# Patient Record
Sex: FEMALE | Race: WHITE | Employment: FULL TIME | URBAN - METROPOLITAN AREA
[De-identification: names, ages, dates, MRNs, and addresses within clinical notes are randomized per-mention and may not be internally consistent; named-entity substitution may affect disease eponyms.]

---

## 2017-03-20 ENCOUNTER — OFFICE VISIT (OUTPATIENT)
Dept: FAMILY MEDICINE CLINIC | Facility: CLINIC | Age: 32
End: 2017-03-20

## 2017-03-20 VITALS
WEIGHT: 178.38 LBS | RESPIRATION RATE: 12 BRPM | SYSTOLIC BLOOD PRESSURE: 110 MMHG | DIASTOLIC BLOOD PRESSURE: 60 MMHG | HEIGHT: 63 IN | BODY MASS INDEX: 31.61 KG/M2 | TEMPERATURE: 98 F | HEART RATE: 74 BPM

## 2017-03-20 DIAGNOSIS — J06.9 VIRAL URI: ICD-10-CM

## 2017-03-20 DIAGNOSIS — J02.9 SORE THROAT: Primary | ICD-10-CM

## 2017-03-20 LAB
CONTROL LINE PRESENT WITH A CLEAR BACKGROUND (YES/NO): YES YES/NO
STREP GRP A CUL-SCR: POSITIVE

## 2017-03-20 PROCEDURE — 96372 THER/PROPH/DIAG INJ SC/IM: CPT | Performed by: FAMILY MEDICINE

## 2017-03-20 PROCEDURE — 87880 STREP A ASSAY W/OPTIC: CPT | Performed by: FAMILY MEDICINE

## 2017-03-20 PROCEDURE — 99213 OFFICE O/P EST LOW 20 MIN: CPT | Performed by: FAMILY MEDICINE

## 2017-03-20 NOTE — PROGRESS NOTES
HPI:   Regis Ortega is a 27year old female who presents for upper respiratory symptoms for 3 days. Symptoms include sore throat, achiness, cough, vomited a few times sat, decreased appetite, head pressure, congested.  Thinks she's getting fevers, waking discharge  HEENT: congested, sore throat  LUNGS: denies shortness of breath with exertion; + cough  CARDIOVASCULAR: denies chest pain on exertion  GI: no nausea or abdominal pain  NEURO: denies headaches    EXAM:   /60 mmHg  Pulse 74  Temp(Src) 97.9

## 2017-03-31 ENCOUNTER — TELEPHONE (OUTPATIENT)
Dept: FAMILY MEDICINE CLINIC | Facility: CLINIC | Age: 32
End: 2017-03-31

## 2017-03-31 RX ORDER — ESCITALOPRAM OXALATE 10 MG/1
TABLET ORAL
Qty: 90 TABLET | Refills: 0 | OUTPATIENT
Start: 2017-03-31

## 2017-05-19 ENCOUNTER — TELEPHONE (OUTPATIENT)
Dept: FAMILY MEDICINE CLINIC | Facility: CLINIC | Age: 32
End: 2017-05-19

## 2017-06-16 ENCOUNTER — OFFICE VISIT (OUTPATIENT)
Dept: FAMILY MEDICINE CLINIC | Facility: CLINIC | Age: 32
End: 2017-06-16

## 2017-06-16 VITALS
TEMPERATURE: 98 F | HEART RATE: 88 BPM | SYSTOLIC BLOOD PRESSURE: 128 MMHG | DIASTOLIC BLOOD PRESSURE: 78 MMHG | WEIGHT: 179 LBS | BODY MASS INDEX: 32 KG/M2

## 2017-06-16 DIAGNOSIS — Z13.1 DIABETES MELLITUS SCREENING: ICD-10-CM

## 2017-06-16 DIAGNOSIS — Z13.29 THYROID DISORDER SCREEN: ICD-10-CM

## 2017-06-16 DIAGNOSIS — Z12.39 SCREENING BREAST EXAMINATION: ICD-10-CM

## 2017-06-16 DIAGNOSIS — N92.6 IRREGULAR PERIODS: ICD-10-CM

## 2017-06-16 DIAGNOSIS — Z13.220 LIPID SCREENING: ICD-10-CM

## 2017-06-16 DIAGNOSIS — E55.9 VITAMIN D DEFICIENCY: ICD-10-CM

## 2017-06-16 DIAGNOSIS — G44.209 TENSION HEADACHE: ICD-10-CM

## 2017-06-16 DIAGNOSIS — Z13.0 SCREENING, ANEMIA, DEFICIENCY, IRON: ICD-10-CM

## 2017-06-16 DIAGNOSIS — F33.41 RECURRENT MAJOR DEPRESSIVE DISORDER, IN PARTIAL REMISSION (HCC): ICD-10-CM

## 2017-06-16 DIAGNOSIS — R45.86 MOOD SWINGS: ICD-10-CM

## 2017-06-16 DIAGNOSIS — Z12.4 CERVICAL CANCER SCREENING: ICD-10-CM

## 2017-06-16 DIAGNOSIS — Z00.00 ROUTINE HISTORY AND PHYSICAL EXAMINATION OF ADULT: Primary | ICD-10-CM

## 2017-06-16 DIAGNOSIS — G43.711 INTRACTABLE CHRONIC MIGRAINE WITHOUT AURA AND WITH STATUS MIGRAINOSUS: ICD-10-CM

## 2017-06-16 PROCEDURE — 85025 COMPLETE CBC W/AUTO DIFF WBC: CPT | Performed by: FAMILY MEDICINE

## 2017-06-16 PROCEDURE — 86800 THYROGLOBULIN ANTIBODY: CPT | Performed by: FAMILY MEDICINE

## 2017-06-16 PROCEDURE — 82306 VITAMIN D 25 HYDROXY: CPT | Performed by: FAMILY MEDICINE

## 2017-06-16 PROCEDURE — 84439 ASSAY OF FREE THYROXINE: CPT | Performed by: FAMILY MEDICINE

## 2017-06-16 PROCEDURE — 80053 COMPREHEN METABOLIC PANEL: CPT | Performed by: FAMILY MEDICINE

## 2017-06-16 PROCEDURE — 83001 ASSAY OF GONADOTROPIN (FSH): CPT | Performed by: FAMILY MEDICINE

## 2017-06-16 PROCEDURE — 84480 ASSAY TRIIODOTHYRONINE (T3): CPT | Performed by: FAMILY MEDICINE

## 2017-06-16 PROCEDURE — 86376 MICROSOMAL ANTIBODY EACH: CPT | Performed by: FAMILY MEDICINE

## 2017-06-16 PROCEDURE — 84443 ASSAY THYROID STIM HORMONE: CPT | Performed by: FAMILY MEDICINE

## 2017-06-16 PROCEDURE — G0438 PPPS, INITIAL VISIT: HCPCS | Performed by: FAMILY MEDICINE

## 2017-06-16 PROCEDURE — 80061 LIPID PANEL: CPT | Performed by: FAMILY MEDICINE

## 2017-06-16 PROCEDURE — 36415 COLL VENOUS BLD VENIPUNCTURE: CPT | Performed by: FAMILY MEDICINE

## 2017-06-16 PROCEDURE — 87624 HPV HI-RISK TYP POOLED RSLT: CPT | Performed by: FAMILY MEDICINE

## 2017-06-16 PROCEDURE — 87625 HPV TYPES 16 & 18 ONLY: CPT | Performed by: FAMILY MEDICINE

## 2017-06-16 PROCEDURE — 83036 HEMOGLOBIN GLYCOSYLATED A1C: CPT | Performed by: FAMILY MEDICINE

## 2017-06-16 PROCEDURE — 88175 CYTOPATH C/V AUTO FLUID REDO: CPT | Performed by: FAMILY MEDICINE

## 2017-06-16 PROCEDURE — 82670 ASSAY OF TOTAL ESTRADIOL: CPT | Performed by: FAMILY MEDICINE

## 2017-06-16 PROCEDURE — 99395 PREV VISIT EST AGE 18-39: CPT | Performed by: FAMILY MEDICINE

## 2017-06-16 PROCEDURE — 83002 ASSAY OF GONADOTROPIN (LH): CPT | Performed by: FAMILY MEDICINE

## 2017-06-16 NOTE — PROGRESS NOTES
HPI:   Nathaly Barrera is a 27year old female who presents for a PAP and a check on a few things. She reports her mood is doing quite a swing the past few months. She can find extreme mood fluctuations from one time of the day to another.  When she f 06/30/2014 26 03/17/2014 14   ----------       Current Outpatient Prescriptions:  escitalopram 10 MG Oral Tab Take 1 tablet (10 mg total) by mouth once daily.  Disp: 90 tablet Rfl: 3   SUMAtriptan Succinate (IMITREX) 50 MG Oral Tab Take 1 tablet (50 mg cold intolerance    EXAM:   /78 mmHg  Pulse 88  Temp(Src) 98.1 °F (36.7 °C) (Temporal)  Wt 179 lb  LMP 06/06/2017 (Approximate)  Body mass index is 31.72 kg/(m^2).    GENERAL: well developed, well nourished,in no apparent distress  SKIN: no rashes,no examination of adult  (primary encounter diagnosis)  Vitamin d deficiency  Lipid screening  Thyroid disorder screen  Mood swings (hcc)  Recurrent major depressive disorder, in partial remission (hcc)  Intractable chronic migraine without aura and with stat

## 2017-06-19 ENCOUNTER — PATIENT MESSAGE (OUTPATIENT)
Dept: FAMILY MEDICINE CLINIC | Facility: CLINIC | Age: 32
End: 2017-06-19

## 2017-06-19 DIAGNOSIS — E55.9 VITAMIN D DEFICIENCY: ICD-10-CM

## 2017-06-19 DIAGNOSIS — E03.8 SUBCLINICAL HYPOTHYROIDISM: Primary | ICD-10-CM

## 2017-06-19 NOTE — TELEPHONE ENCOUNTER
From: Celena Ruiz  To: Yessy Dumont MD  Sent: 6/19/2017 8:05 AM CDT  Subject: Test Results Question    Hi Dr Mare Stover, I received your comments on my test results and am good with your recommendation to restart the thyroid and vitamin D treatments if you

## 2017-06-20 PROBLEM — E03.8 SUBCLINICAL HYPOTHYROIDISM: Status: ACTIVE | Noted: 2017-06-20

## 2017-06-20 RX ORDER — LEVOTHYROXINE SODIUM 0.07 MG/1
75 TABLET ORAL
Qty: 30 TABLET | Refills: 3 | Status: SHIPPED | OUTPATIENT
Start: 2017-06-20 | End: 2017-11-13

## 2017-06-20 RX ORDER — ERGOCALCIFEROL 1.25 MG/1
50000 CAPSULE ORAL WEEKLY
Qty: 12 CAPSULE | Refills: 0 | Status: SHIPPED | OUTPATIENT
Start: 2017-06-20 | End: 2017-09-11

## 2017-06-28 ENCOUNTER — OFFICE VISIT (OUTPATIENT)
Dept: FAMILY MEDICINE CLINIC | Facility: CLINIC | Age: 32
End: 2017-06-28

## 2017-06-28 ENCOUNTER — TELEPHONE (OUTPATIENT)
Dept: FAMILY MEDICINE CLINIC | Facility: CLINIC | Age: 32
End: 2017-06-28

## 2017-06-28 VITALS
HEART RATE: 63 BPM | WEIGHT: 168 LBS | TEMPERATURE: 99 F | BODY MASS INDEX: 30.14 KG/M2 | SYSTOLIC BLOOD PRESSURE: 100 MMHG | HEIGHT: 62.5 IN | OXYGEN SATURATION: 98 % | RESPIRATION RATE: 14 BRPM | DIASTOLIC BLOOD PRESSURE: 78 MMHG

## 2017-06-28 DIAGNOSIS — J02.9 SORE THROAT: ICD-10-CM

## 2017-06-28 DIAGNOSIS — J02.9 ACUTE PHARYNGITIS, UNSPECIFIED ETIOLOGY: Primary | ICD-10-CM

## 2017-06-28 PROCEDURE — 87081 CULTURE SCREEN ONLY: CPT | Performed by: PHYSICIAN ASSISTANT

## 2017-06-28 PROCEDURE — 99213 OFFICE O/P EST LOW 20 MIN: CPT | Performed by: PHYSICIAN ASSISTANT

## 2017-06-28 PROCEDURE — 87880 STREP A ASSAY W/OPTIC: CPT | Performed by: PHYSICIAN ASSISTANT

## 2017-06-28 NOTE — TELEPHONE ENCOUNTER
Message   Received: Today   Message Contents   Leno Bunchhectorgagan sent to Geovani Bello Nurse   Caller: Pt (Today,  4:36 PM)             PT is calling back as she feels her symptoms for strep are increasing.  Last time she had strep, had same symptoms and it

## 2017-06-28 NOTE — PATIENT INSTRUCTIONS
We will send out a throat culture and will contact you with the results in 48-72 hours. If positive, then we will call in an appropriate antibiotic. If negative, then the sore throat is most likely viral in origin and should resolve within 7-10 days.    Com antibiotics. After this time, you will not be contagious. You can then return to work or school if you are feeling better.   · Take the antibiotic medicine for the full 10 days, even if you feel better.  This is very important to make sure the infection is 100.4°F (38°C) that continues for more than 1 day. ¨ Your child is 3years old or older and has a fever of 100.4°F (38°C) that continues for more than 3 days.   · New or worsening ear pain, sinus pain, or headache  · Painful lumps in the back of neck  · [facility]

## 2017-06-28 NOTE — PROGRESS NOTES
CHIEF COMPLAINT:   Patient presents with:  Sore Throat: headache since earlier today     HPI:   Gina Ross is a 27year old female presents to clinic with complaint of sore throat. Patient has had since earlier this afternoon.  Patient reports follow /78 (BP Location: Right arm, Patient Position: Sitting, Cuff Size: adult)   Pulse 63   Temp 98.7 °F (37.1 °C) (Oral)   Resp 14   Ht 62.5\"   Wt 168 lb   LMP 06/06/2017 (Approximate)   SpO2 98%   BMI 30.24 kg/m²   GENERAL: well developed, well nourish The patient/parent indicates understanding of these issues and agrees to the plan. Patient Instructions   We will send out a throat culture and will contact you with the results in 48-72 hours.  If positive, then we will call in an appropriate antibioti · Rest at home. Drink plenty of fluids so you won't get dehydrated. · If the test is positive for strep, don't go to work or school for the first 2 days of taking the antibiotics. After this time, you will not be contagious.  You can then return to work or · Fever as directed by your healthcare provider. For children, seek care if:  ¨ Your child is of any age and has repeated fevers above 104°F (40°C).   ¨ Your child is younger than 3years of age and has a fever of 100.4°F (38°C) that continues for more than

## 2017-06-28 NOTE — TELEPHONE ENCOUNTER
Spoke with the pt and she was fine this morning and then after lunch she started to have throat pain and headache and it has gotten worse throughout the day and she wonders if it could be strep as she had the same thing before and it was strep- advised reinier

## 2017-06-28 NOTE — TELEPHONE ENCOUNTER
Pt's live in boyfriend has a cough/ flu. Pt wants to know if there is anything we can give her to stop it from spreading to her. She is starting to have a sore throat as well.      Please return call to [phone]

## 2017-07-21 ENCOUNTER — OFFICE VISIT (OUTPATIENT)
Dept: FAMILY MEDICINE CLINIC | Facility: CLINIC | Age: 32
End: 2017-07-21

## 2017-07-21 VITALS
SYSTOLIC BLOOD PRESSURE: 114 MMHG | DIASTOLIC BLOOD PRESSURE: 76 MMHG | RESPIRATION RATE: 16 BRPM | TEMPERATURE: 98 F | HEART RATE: 76 BPM | BODY MASS INDEX: 32 KG/M2 | WEIGHT: 180 LBS

## 2017-07-21 DIAGNOSIS — Z12.4 CERVICAL CANCER SCREENING: Primary | ICD-10-CM

## 2017-07-21 PROCEDURE — 87624 HPV HI-RISK TYP POOLED RSLT: CPT | Performed by: FAMILY MEDICINE

## 2017-07-21 PROCEDURE — 88175 CYTOPATH C/V AUTO FLUID REDO: CPT | Performed by: FAMILY MEDICINE

## 2017-07-24 LAB — HPV I/H RISK 1 DNA SPEC QL NAA+PROBE: NEGATIVE

## 2017-07-24 NOTE — PROGRESS NOTES
Farrah Robles is a 27year old female. HPI:   PT is here for a repeat PAP, our last one showed HPV + but insufficient cells for PAP. No new gyne symptoms.  She thinks she is feeling a bit better on her synthroid and vit d, but will f/u in 6 weeks or so w exam reveals some bleeding from os (pt just started her period), but o/w normal, no lesions    ASSESSMENT AND PLAN:   Diagnoses and all orders for this visit:    Cervical cancer screening  Comments:  recent pap insufficient cells--doing repeat today  Order

## 2017-08-05 RX ORDER — ESCITALOPRAM OXALATE 10 MG/1
10 TABLET ORAL
Qty: 90 TABLET | Refills: 3 | Status: SHIPPED | OUTPATIENT
Start: 2017-08-05 | End: 2018-03-06

## 2017-08-05 NOTE — TELEPHONE ENCOUNTER
LOV  07/21/2017    Last refill    escitalopram 10 MG Oral Tab 90 tablet 3 7/27/2016     Sig - Route:  Take 1 tablet (10 mg total) by mouth once daily. - Oral    E-Prescribing Status: Receipt confirmed by pharmacy (7/27/2016  8:28 AM CDT)      Medication pen

## 2017-08-10 ENCOUNTER — TELEPHONE (OUTPATIENT)
Dept: FAMILY MEDICINE CLINIC | Facility: CLINIC | Age: 32
End: 2017-08-10

## 2017-08-11 NOTE — TELEPHONE ENCOUNTER
Called the pt and rescheduled her colposcopy  Future Appointments  Date Time Provider Olga Jarvis   9/2/2017 8:30 AM [facility] [place] NURSE [facility] JASE Beltran   9/6/2017 4:00 PM Lennox Alanis, MD [facility] JASE [facility]tran

## 2017-09-02 ENCOUNTER — NURSE ONLY (OUTPATIENT)
Dept: FAMILY MEDICINE CLINIC | Facility: CLINIC | Age: 32
End: 2017-09-02

## 2017-09-02 DIAGNOSIS — E55.9 VITAMIN D DEFICIENCY: ICD-10-CM

## 2017-09-02 DIAGNOSIS — E03.8 SUBCLINICAL HYPOTHYROIDISM: ICD-10-CM

## 2017-09-02 LAB
25-HYDROXYVITAMIN D (TOTAL): 49.7 NG/ML (ref 30–100)
TSI SER-ACNC: <0.005 MIU/ML (ref 0.35–5.5)

## 2017-09-02 PROCEDURE — 84443 ASSAY THYROID STIM HORMONE: CPT | Performed by: FAMILY MEDICINE

## 2017-09-02 PROCEDURE — 82306 VITAMIN D 25 HYDROXY: CPT | Performed by: FAMILY MEDICINE

## 2017-09-02 PROCEDURE — 36415 COLL VENOUS BLD VENIPUNCTURE: CPT | Performed by: FAMILY MEDICINE

## 2017-09-04 DIAGNOSIS — E03.9 ACQUIRED HYPOTHYROIDISM: ICD-10-CM

## 2017-09-04 DIAGNOSIS — E55.9 VITAMIN D DEFICIENCY: Primary | ICD-10-CM

## 2017-09-05 ENCOUNTER — TELEPHONE (OUTPATIENT)
Dept: FAMILY MEDICINE CLINIC | Facility: CLINIC | Age: 32
End: 2017-09-05
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
## 2017-09-06 ENCOUNTER — OFFICE VISIT (OUTPATIENT)
Dept: FAMILY MEDICINE CLINIC | Facility: CLINIC | Age: 32
End: 2017-09-06

## 2017-09-06 VITALS
RESPIRATION RATE: 14 BRPM | HEART RATE: 64 BPM | SYSTOLIC BLOOD PRESSURE: 100 MMHG | WEIGHT: 183.38 LBS | TEMPERATURE: 98 F | BODY MASS INDEX: 33 KG/M2 | DIASTOLIC BLOOD PRESSURE: 70 MMHG

## 2017-09-06 DIAGNOSIS — M54.2 NECK PAIN: ICD-10-CM
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
## 2017-09-06 DIAGNOSIS — M54.50 ACUTE BILATERAL LOW BACK PAIN WITHOUT SCIATICA: ICD-10-CM

## 2017-09-06 DIAGNOSIS — Z09 FOLLOW-UP EXAM AFTER TREATMENT: Primary | ICD-10-CM

## 2017-09-06 PROCEDURE — 99214 OFFICE O/P EST MOD 30 MIN: CPT | Performed by: FAMILY MEDICINE

## 2017-09-06 RX ORDER — HYDROCODONE BITARTRATE AND ACETAMINOPHEN 5; 325 MG/1; MG/1
1 TABLET ORAL EVERY 6 HOURS PRN
COMMUNITY
End: 2017-10-16 | Stop reason: ALTCHOICE
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
## 2017-09-11 ENCOUNTER — OFFICE VISIT (OUTPATIENT)
Dept: FAMILY MEDICINE CLINIC | Facility: CLINIC | Age: 32
End: 2017-09-11

## 2017-09-11 VITALS
BODY MASS INDEX: 32 KG/M2 | HEART RATE: 64 BPM | TEMPERATURE: 98 F | DIASTOLIC BLOOD PRESSURE: 70 MMHG | WEIGHT: 180 LBS | SYSTOLIC BLOOD PRESSURE: 110 MMHG | RESPIRATION RATE: 16 BRPM

## 2017-09-11 DIAGNOSIS — Z01.812 PRE-PROCEDURE LAB EXAM: Primary | ICD-10-CM

## 2017-09-11 DIAGNOSIS — Z23 INFLUENZA VACCINE NEEDED: ICD-10-CM

## 2017-09-11 DIAGNOSIS — L68.0 HIRSUTISM: ICD-10-CM

## 2017-09-11 DIAGNOSIS — R87.615 UNSATISFACTORY CERVICAL PAPANICOLAOU SMEAR: ICD-10-CM

## 2017-09-11 DIAGNOSIS — R87.810 CERVICAL HIGH RISK HUMAN PAPILLOMAVIRUS (HPV) DNA TEST POSITIVE: ICD-10-CM

## 2017-09-11 LAB
POCT URINE PREGNANCY: NEGATIVE
PROCEDURE CONTROL: YES

## 2017-09-11 PROCEDURE — 84403 ASSAY OF TOTAL TESTOSTERONE: CPT | Performed by: FAMILY MEDICINE

## 2017-09-11 PROCEDURE — 90686 IIV4 VACC NO PRSV 0.5 ML IM: CPT | Performed by: FAMILY MEDICINE

## 2017-09-11 PROCEDURE — 83498 ASY HYDROXYPROGESTERONE 17-D: CPT | Performed by: FAMILY MEDICINE

## 2017-09-11 PROCEDURE — 82627 DEHYDROEPIANDROSTERONE: CPT | Performed by: FAMILY MEDICINE

## 2017-09-11 PROCEDURE — 83001 ASSAY OF GONADOTROPIN (FSH): CPT | Performed by: FAMILY MEDICINE

## 2017-09-11 PROCEDURE — 90471 IMMUNIZATION ADMIN: CPT | Performed by: FAMILY MEDICINE

## 2017-09-11 PROCEDURE — 99212 OFFICE O/P EST SF 10 MIN: CPT | Performed by: FAMILY MEDICINE

## 2017-09-11 PROCEDURE — 83002 ASSAY OF GONADOTROPIN (LH): CPT | Performed by: FAMILY MEDICINE

## 2017-09-11 PROCEDURE — 88305 TISSUE EXAM BY PATHOLOGIST: CPT | Performed by: FAMILY MEDICINE

## 2017-09-11 PROCEDURE — 84402 ASSAY OF FREE TESTOSTERONE: CPT | Performed by: FAMILY MEDICINE

## 2017-09-11 PROCEDURE — 57421 EXAM/BIOPSY OF VAG W/SCOPE: CPT | Performed by: FAMILY MEDICINE

## 2017-09-11 RX ORDER — ERGOCALCIFEROL 1.25 MG/1
50000 CAPSULE ORAL
Qty: 8 CAPSULE | Refills: 3 | Status: SHIPPED | OUTPATIENT
Start: 2017-09-11 | End: 2018-09-26

## 2017-09-11 NOTE — PROGRESS NOTES
Aliyah Moore is a 27year old female. HPI:   Pt is here for a colposcopy.      Indication: HPV + x1, repeat neg but unsatisfactory sample both times  Prior colposcopy: no  Pregnancy test: neg  Iodine allergy: no  Vinegar allergy: no    Pt has no mehnaz aceitic acid applied to cervix; cervix cleaned with iodine and biopsies taken as below using sterile technique    EXAM:   /70   Pulse 64   Temp 98.1 °F (36.7 °C) (Temporal)   Resp 16   Wt 180 lb   LMP 09/02/2017   BMI 32.40 kg/m²   GENERAL: well deve

## 2017-09-11 NOTE — TELEPHONE ENCOUNTER
Last OV 9/6/17, Future Appointments  Date Time Provider Olga Simona   9/11/2017 2:40 PM Cabrera Rodriguez MD [facility] Candy Gan   9/12/2017 6:15 PM [facility] Gerda Maza   9/14/2017 6:15 PM [facility] SHARI Welch   9/1

## 2017-09-12 ENCOUNTER — OFFICE VISIT (OUTPATIENT)
Dept: PHYSICAL THERAPY | Age: 32
End: 2017-09-12
Attending: FAMILY MEDICINE
Payer: COMMERCIAL

## 2017-09-12 DIAGNOSIS — M54.2 NECK PAIN: ICD-10-CM

## 2017-09-12 DIAGNOSIS — M54.50 ACUTE BILATERAL LOW BACK PAIN WITHOUT SCIATICA: ICD-10-CM
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
## 2017-09-12 DIAGNOSIS — Z09 FOLLOW-UP EXAM AFTER TREATMENT: ICD-10-CM

## 2017-09-12 LAB
FSH: 7 MIU/ML
LH: 10.1 MIU/ML

## 2017-09-12 PROCEDURE — 97140 MANUAL THERAPY 1/> REGIONS: CPT

## 2017-09-12 PROCEDURE — 97110 THERAPEUTIC EXERCISES: CPT

## 2017-09-12 PROCEDURE — 97162 PT EVAL MOD COMPLEX 30 MIN: CPT
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
## 2017-09-13 LAB — DHEA SULFATE, SERUM: 313 UG/DL

## 2017-09-14 ENCOUNTER — OFFICE VISIT (OUTPATIENT)
Dept: PHYSICAL THERAPY | Age: 32
End: 2017-09-14
Attending: FAMILY MEDICINE
Payer: COMMERCIAL

## 2017-09-14 DIAGNOSIS — M54.50 ACUTE BILATERAL LOW BACK PAIN WITHOUT SCIATICA: ICD-10-CM

## 2017-09-14 DIAGNOSIS — M54.2 NECK PAIN: ICD-10-CM
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
## 2017-09-14 DIAGNOSIS — Z09 FOLLOW-UP EXAM AFTER TREATMENT: ICD-10-CM

## 2017-09-14 LAB
17-HYDROPROGESTERONE QNT: 28.3 NG/DL
SEX HORMONE BINDING GLOBULIN: 62 NMOL/L
TESTOSTERONE -MS, BIOAVAILAB: 12 NG/DL
TESTOSTERONE, -MS/MS: 39 NG/DL
TESTOSTERONE, FREE -MS/MS: 4.4 PG/ML

## 2017-09-14 PROCEDURE — 97110 THERAPEUTIC EXERCISES: CPT

## 2017-09-14 PROCEDURE — 97140 MANUAL THERAPY 1/> REGIONS: CPT
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
## 2017-09-19 ENCOUNTER — APPOINTMENT (OUTPATIENT)
Dept: PHYSICAL THERAPY | Age: 32
End: 2017-09-19
Attending: FAMILY MEDICINE
Payer: COMMERCIAL

## 2017-09-21 ENCOUNTER — OFFICE VISIT (OUTPATIENT)
Dept: PHYSICAL THERAPY | Age: 32
End: 2017-09-21
Attending: FAMILY MEDICINE
Payer: COMMERCIAL

## 2017-09-21 PROCEDURE — 97140 MANUAL THERAPY 1/> REGIONS: CPT

## 2017-09-21 PROCEDURE — 97110 THERAPEUTIC EXERCISES: CPT
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
## 2017-09-26 ENCOUNTER — OFFICE VISIT (OUTPATIENT)
Dept: PHYSICAL THERAPY | Age: 32
End: 2017-09-26
Attending: FAMILY MEDICINE
Payer: COMMERCIAL

## 2017-09-26 PROCEDURE — 97110 THERAPEUTIC EXERCISES: CPT

## 2017-09-26 PROCEDURE — 97140 MANUAL THERAPY 1/> REGIONS: CPT
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
## 2017-09-28 ENCOUNTER — OFFICE VISIT (OUTPATIENT)
Dept: PHYSICAL THERAPY | Age: 32
End: 2017-09-28
Attending: INTERNAL MEDICINE
Payer: COMMERCIAL

## 2017-09-28 PROCEDURE — 97140 MANUAL THERAPY 1/> REGIONS: CPT

## 2017-09-28 PROCEDURE — 97112 NEUROMUSCULAR REEDUCATION: CPT
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
## 2017-10-03 ENCOUNTER — OFFICE VISIT (OUTPATIENT)
Dept: PHYSICAL THERAPY | Age: 32
End: 2017-10-03
Attending: FAMILY MEDICINE
Payer: COMMERCIAL

## 2017-10-03 PROCEDURE — 97112 NEUROMUSCULAR REEDUCATION: CPT

## 2017-10-03 PROCEDURE — 97140 MANUAL THERAPY 1/> REGIONS: CPT
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
## 2017-10-11 ENCOUNTER — APPOINTMENT (OUTPATIENT)
Dept: PHYSICAL THERAPY | Age: 32
End: 2017-10-11
Attending: FAMILY MEDICINE
Payer: COMMERCIAL

## 2017-10-13 ENCOUNTER — TELEPHONE (OUTPATIENT)
Dept: FAMILY MEDICINE CLINIC | Facility: CLINIC | Age: 32
End: 2017-10-13
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
## 2017-10-16 ENCOUNTER — OFFICE VISIT (OUTPATIENT)
Dept: FAMILY MEDICINE CLINIC | Facility: CLINIC | Age: 32
End: 2017-10-16

## 2017-10-16 VITALS
BODY MASS INDEX: 33 KG/M2 | DIASTOLIC BLOOD PRESSURE: 90 MMHG | HEART RATE: 88 BPM | SYSTOLIC BLOOD PRESSURE: 140 MMHG | WEIGHT: 184 LBS | TEMPERATURE: 98 F | OXYGEN SATURATION: 99 %

## 2017-10-16 DIAGNOSIS — J18.9 WALKING PNEUMONIA: Primary | ICD-10-CM

## 2017-10-16 PROCEDURE — 99214 OFFICE O/P EST MOD 30 MIN: CPT | Performed by: FAMILY MEDICINE

## 2017-10-16 RX ORDER — AZITHROMYCIN 250 MG/1
TABLET, FILM COATED ORAL
Qty: 6 TABLET | Refills: 0 | Status: SHIPPED | OUTPATIENT
Start: 2017-10-16 | End: 2017-11-13 | Stop reason: ALTCHOICE

## 2017-10-16 RX ORDER — PROMETHAZINE HYDROCHLORIDE AND CODEINE PHOSPHATE 6.25; 1 MG/5ML; MG/5ML
5 SYRUP ORAL EVERY 4 HOURS PRN
Qty: 180 ML | Refills: 0 | Status: SHIPPED
Start: 2017-10-16 | End: 2017-10-23

## 2017-10-16 NOTE — PROGRESS NOTES
HPI:   Jersey Wesley is a 27year old female who presents for upper respiratory symptoms for 4 days. Symptoms include cough, severe sore throat, feels like razor blades in her throat and having painful coughing fits.  Has even thrown up she coughed so h Smokeless tobacco: Never Used                      Alcohol use: Yes           0.0 oz/week     Comment: 2 to 3 glasess of wine or beer/week        REVIEW OF SYSTEMS:   GENERAL: a little tired and achy  SKIN: no rashes  EYES:no redness or di

## 2017-10-20 ENCOUNTER — TELEPHONE (OUTPATIENT)
Dept: FAMILY MEDICINE CLINIC | Facility: CLINIC | Age: 32
End: 2017-10-20

## 2017-10-20 RX ORDER — PREDNISONE 20 MG/1
TABLET ORAL
Qty: 6 TABLET | Refills: 0 | Status: SHIPPED | OUTPATIENT
Start: 2017-10-20 | End: 2017-11-13 | Stop reason: ALTCHOICE

## 2017-10-20 NOTE — TELEPHONE ENCOUNTER
PT CALLED AND ADV THAT WAS SEEN ON Monday 10/16--PT STILL TAKING MEDS AND IS NOT FEELING BETTER. SINCE THEN SHE HAS DEVELOPED CHEST CONGESTION.  WONDERING WHAT NEXT STEP WOULD BE  OR    IF PT NEEDS TO BE SEEN AGAIN    Nathanael Roper YOU     PHARMACY  Oxana Venegas

## 2017-10-20 NOTE — TELEPHONE ENCOUNTER
I would add prednisone, 40mg po today, 20mg po x 4 days and call Monday if not improving, she'll need to be seen again

## 2017-10-20 NOTE — TELEPHONE ENCOUNTER
Called the pt and advised of the medication- she v/u  She asks if she can still use the cough syrup and the sudafed while on the steroid- I advised that she can but that the steroid has a hx of making people jittery and it can be a little more with the garrison

## 2017-10-26 ENCOUNTER — OFFICE VISIT (OUTPATIENT)
Dept: PHYSICAL THERAPY | Age: 32
End: 2017-10-26
Attending: FAMILY MEDICINE
Payer: COMMERCIAL

## 2017-10-26 PROCEDURE — 97164 PT RE-EVAL EST PLAN CARE: CPT
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
## 2017-11-06 RX ORDER — LEVOTHYROXINE SODIUM 0.07 MG/1
TABLET ORAL
Qty: 30 TABLET | Refills: 5 | Status: CANCELLED | OUTPATIENT
Start: 2017-11-06

## 2017-11-06 NOTE — TELEPHONE ENCOUNTER
She was supposed to be taking 1/2 of this dose as of our last blood test--can you please see if she's been cutting this in 1/2?  In addition, she is due for her blood work now

## 2017-11-06 NOTE — TELEPHONE ENCOUNTER
Last OV 10/16/17, No future appointments.     Last rx given 6/20/17  Last TSH drawn 9/2/17 was <0.005

## 2017-11-14 ENCOUNTER — TELEPHONE (OUTPATIENT)
Dept: FAMILY MEDICINE CLINIC | Facility: CLINIC | Age: 32
End: 2017-11-14

## 2017-11-14 NOTE — TELEPHONE ENCOUNTER
FYI:    PT COMING IN ON 12/2 FOR NURSE APPT TO RECHECK THYROID LEVELS.     PLEASE PLACE ORDERS    THANK YOU

## 2017-11-18 NOTE — TELEPHONE ENCOUNTER
Future Appointments  Date Time Provider Olga Jarvis   12/2/2017 8:30 AM  [facility] Oxana Howell     See email 11/13/17

## 2017-12-02 ENCOUNTER — NURSE ONLY (OUTPATIENT)
Dept: FAMILY MEDICINE CLINIC | Facility: CLINIC | Age: 32
End: 2017-12-02

## 2017-12-02 DIAGNOSIS — E03.9 ACQUIRED HYPOTHYROIDISM: ICD-10-CM

## 2017-12-02 PROCEDURE — 84443 ASSAY THYROID STIM HORMONE: CPT | Performed by: FAMILY MEDICINE

## 2017-12-06 ENCOUNTER — PATIENT MESSAGE (OUTPATIENT)
Dept: FAMILY MEDICINE CLINIC | Facility: CLINIC | Age: 32
End: 2017-12-06

## 2017-12-06 NOTE — TELEPHONE ENCOUNTER
From: Aliyah Moore  To: Keane Rubinstein, MD  Sent: 12/6/2017 9:33 AM CST  Subject: Test Results Question    Hi Dr. Abdullahi Manrique, I got your message about my Thyroid - I guess it just does NOT want to make up its mind! LOL!  I have been taking 1/2 pill daily on e

## 2017-12-07 RX ORDER — LEVOTHYROXINE SODIUM 0.05 MG/1
50 TABLET ORAL
Qty: 30 TABLET | Refills: 3 | Status: SHIPPED | OUTPATIENT
Start: 2017-12-07 | End: 2018-04-03

## 2018-02-12 ENCOUNTER — PATIENT OUTREACH (OUTPATIENT)
Dept: FAMILY MEDICINE CLINIC | Facility: CLINIC | Age: 33
End: 2018-02-12

## 2018-03-06 RX ORDER — ESCITALOPRAM OXALATE 20 MG/1
20 TABLET ORAL
Qty: 90 TABLET | Refills: 1 | Status: SHIPPED | OUTPATIENT
Start: 2018-03-06 | End: 2018-08-30

## 2018-03-06 RX ORDER — ESCITALOPRAM OXALATE 10 MG/1
10 TABLET ORAL
Status: CANCELLED | OUTPATIENT
Start: 2018-03-06

## 2018-03-06 NOTE — TELEPHONE ENCOUNTER
From: David Teresa  Sent: 3/6/2018 8:03 AM CST  Subject: Medication Renewal Request    Geovanny Jensen.  Diann Yanez would like a refill of the following medications:     ESCITALOPRAM 10 MG Oral Tab Fab Wilks MD]   Patient Comment: On 12/22/17, Dr cy reaves

## 2018-04-03 RX ORDER — LEVOTHYROXINE SODIUM 0.05 MG/1
TABLET ORAL
Qty: 30 TABLET | Refills: 0 | Status: SHIPPED | OUTPATIENT
Start: 2018-04-03 | End: 2018-05-12

## 2018-05-07 NOTE — TELEPHONE ENCOUNTER
Pt due for TSH with reflex now and she was notified of this in april--is she out of meds? I don't want her to run out but I want her to come in for her lab. ..

## 2018-05-07 NOTE — TELEPHONE ENCOUNTER
Last refilled on 4/3/18 for # 30 with 0 refills  Last TSH labs 12.800 on 12/2/17  Last seen on 10/16/17  No future appointments. Thank you.

## 2018-05-08 ENCOUNTER — TELEPHONE (OUTPATIENT)
Dept: FAMILY MEDICINE CLINIC | Facility: CLINIC | Age: 33
End: 2018-05-08

## 2018-05-08 DIAGNOSIS — E03.8 SUBCLINICAL HYPOTHYROIDISM: Primary | ICD-10-CM

## 2018-05-08 NOTE — TELEPHONE ENCOUNTER
Placed the order for the tsh with reflex    Dr. Mayank Curran do you want her to have the vit d that was due in Jan?

## 2018-05-08 NOTE — TELEPHONE ENCOUNTER
Patient said she got a call from our office yesterday that she is due for lab work. She made an apt for Friday, please make sure orders are placed.

## 2018-05-11 ENCOUNTER — NURSE ONLY (OUTPATIENT)
Dept: FAMILY MEDICINE CLINIC | Facility: CLINIC | Age: 33
End: 2018-05-11

## 2018-05-11 DIAGNOSIS — E55.9 VITAMIN D DEFICIENCY: ICD-10-CM

## 2018-05-11 DIAGNOSIS — E03.8 SUBCLINICAL HYPOTHYROIDISM: ICD-10-CM

## 2018-05-11 PROCEDURE — 84443 ASSAY THYROID STIM HORMONE: CPT | Performed by: FAMILY MEDICINE

## 2018-05-11 PROCEDURE — 82306 VITAMIN D 25 HYDROXY: CPT | Performed by: FAMILY MEDICINE

## 2018-05-11 PROCEDURE — 36415 COLL VENOUS BLD VENIPUNCTURE: CPT | Performed by: FAMILY MEDICINE

## 2018-05-11 NOTE — PROGRESS NOTES
Pt here for her thyroid labs- I asked her if she wants the vit d from January drawn and she is agreeable  I asked if she is taking vit d and she is taking it every other week. Mint and Gold tubes drawn from left arm with straight needle x1.  Pt lenka wel

## 2018-05-12 ENCOUNTER — TELEPHONE (OUTPATIENT)
Dept: FAMILY MEDICINE CLINIC | Facility: CLINIC | Age: 33
End: 2018-05-12

## 2018-05-12 DIAGNOSIS — E03.8 HYPOTHYROIDISM DUE TO HASHIMOTO'S THYROIDITIS: Primary | ICD-10-CM

## 2018-05-12 DIAGNOSIS — E06.3 HYPOTHYROIDISM DUE TO HASHIMOTO'S THYROIDITIS: Primary | ICD-10-CM

## 2018-05-12 RX ORDER — LEVOTHYROXINE SODIUM 0.05 MG/1
50 TABLET ORAL
Qty: 30 TABLET | Refills: 3 | Status: SHIPPED | OUTPATIENT
Start: 2018-05-12 | End: 2018-09-02

## 2018-05-12 NOTE — TELEPHONE ENCOUNTER
----- Message from Elliot Zhu MD sent at 5/12/2018  8:01 AM CDT -----  Vitamin D looks good now--what dose of D is she taking and how often? Thyroid TSH is back in range.   It is upper end of normal, but normal.  Let's recheck the TSH with reflex in 3 m

## 2018-05-12 NOTE — TELEPHONE ENCOUNTER
Patient advised. Verbalized understanding. States she takes Vit D 82296 IU one tablet every 14 days.  Needs refill of levothyroxine    Last refilled on 4/3/18 for # 30 with 0 refills  Last TSH labs 5.290 on 5/11/18  Last seen on 10/16/17  No future appointm

## 2018-05-15 RX ORDER — LEVOTHYROXINE SODIUM 0.05 MG/1
TABLET ORAL
Qty: 30 TABLET | Refills: 0 | OUTPATIENT
Start: 2018-05-15

## 2018-08-30 NOTE — TELEPHONE ENCOUNTER
Last refilled on 3/6/18 for # 90 with 1 refills  Last seen on 10/16/17  Future Appointments  Date Time Provider Olga Jarvis   10/2/2018 4:00 PM Tommy Jha MD [facility] JASE Guidry        Thank you.

## 2018-08-31 RX ORDER — ESCITALOPRAM OXALATE 20 MG/1
TABLET ORAL
Qty: 90 TABLET | Refills: 1 | Status: SHIPPED | OUTPATIENT
Start: 2018-08-31 | End: 2019-02-22

## 2018-09-04 RX ORDER — LEVOTHYROXINE SODIUM 0.05 MG/1
TABLET ORAL
Qty: 90 TABLET | Refills: 0 | Status: SHIPPED | OUTPATIENT
Start: 2018-09-04 | End: 2018-12-02

## 2018-09-11 ENCOUNTER — PATIENT OUTREACH (OUTPATIENT)
Dept: FAMILY MEDICINE CLINIC | Facility: CLINIC | Age: 33
End: 2018-09-11

## 2018-09-26 RX ORDER — ERGOCALCIFEROL 1.25 MG/1
CAPSULE ORAL
Qty: 8 CAPSULE | Refills: 3 | Status: SHIPPED | OUTPATIENT
Start: 2018-09-26 | End: 2018-11-30

## 2018-09-26 NOTE — TELEPHONE ENCOUNTER
Last refilled on 9/11/17 for # 8 with 3 refills  Last vit D 410 on 5/11/18  Last seen on 10/16/17  Future Appointments   Date Time Provider Olga Jarvis   10/2/2018  4:00 PM Criselda Brody MD [facility]        Thank you.

## 2018-10-02 ENCOUNTER — OFFICE VISIT (OUTPATIENT)
Dept: FAMILY MEDICINE CLINIC | Facility: CLINIC | Age: 33
End: 2018-10-02

## 2018-10-02 VITALS
SYSTOLIC BLOOD PRESSURE: 120 MMHG | HEART RATE: 74 BPM | DIASTOLIC BLOOD PRESSURE: 80 MMHG | WEIGHT: 178.38 LBS | HEIGHT: 62.5 IN | TEMPERATURE: 99 F | BODY MASS INDEX: 32 KG/M2 | RESPIRATION RATE: 12 BRPM

## 2018-10-02 DIAGNOSIS — Z13.220 LIPID SCREENING: ICD-10-CM

## 2018-10-02 DIAGNOSIS — Z12.4 CERVICAL CANCER SCREENING: ICD-10-CM

## 2018-10-02 DIAGNOSIS — G43.711 INTRACTABLE CHRONIC MIGRAINE WITHOUT AURA AND WITH STATUS MIGRAINOSUS: ICD-10-CM

## 2018-10-02 DIAGNOSIS — G44.209 TENSION HEADACHE: ICD-10-CM

## 2018-10-02 DIAGNOSIS — E55.9 VITAMIN D DEFICIENCY: ICD-10-CM

## 2018-10-02 DIAGNOSIS — Z13.1 DIABETES MELLITUS SCREENING: ICD-10-CM

## 2018-10-02 DIAGNOSIS — E03.8 HYPOTHYROIDISM DUE TO HASHIMOTO'S THYROIDITIS: ICD-10-CM

## 2018-10-02 DIAGNOSIS — E06.3 HYPOTHYROIDISM DUE TO HASHIMOTO'S THYROIDITIS: ICD-10-CM

## 2018-10-02 DIAGNOSIS — E28.2 PCOS (POLYCYSTIC OVARIAN SYNDROME): ICD-10-CM

## 2018-10-02 DIAGNOSIS — Z13.0 SCREENING FOR DEFICIENCY ANEMIA: ICD-10-CM

## 2018-10-02 DIAGNOSIS — Z23 NEED FOR VACCINATION: ICD-10-CM

## 2018-10-02 DIAGNOSIS — Z00.00 ROUTINE HISTORY AND PHYSICAL EXAMINATION OF ADULT: Primary | ICD-10-CM

## 2018-10-02 PROCEDURE — 84443 ASSAY THYROID STIM HORMONE: CPT | Performed by: FAMILY MEDICINE

## 2018-10-02 PROCEDURE — 80061 LIPID PANEL: CPT | Performed by: FAMILY MEDICINE

## 2018-10-02 PROCEDURE — 99395 PREV VISIT EST AGE 18-39: CPT | Performed by: FAMILY MEDICINE

## 2018-10-02 PROCEDURE — G0438 PPPS, INITIAL VISIT: HCPCS | Performed by: FAMILY MEDICINE

## 2018-10-02 PROCEDURE — 83036 HEMOGLOBIN GLYCOSYLATED A1C: CPT | Performed by: FAMILY MEDICINE

## 2018-10-02 PROCEDURE — 87624 HPV HI-RISK TYP POOLED RSLT: CPT | Performed by: FAMILY MEDICINE

## 2018-10-02 PROCEDURE — 36415 COLL VENOUS BLD VENIPUNCTURE: CPT | Performed by: FAMILY MEDICINE

## 2018-10-02 PROCEDURE — 88175 CYTOPATH C/V AUTO FLUID REDO: CPT | Performed by: FAMILY MEDICINE

## 2018-10-02 PROCEDURE — 84439 ASSAY OF FREE THYROXINE: CPT | Performed by: FAMILY MEDICINE

## 2018-10-02 PROCEDURE — 80053 COMPREHEN METABOLIC PANEL: CPT | Performed by: FAMILY MEDICINE

## 2018-10-02 PROCEDURE — 90471 IMMUNIZATION ADMIN: CPT | Performed by: FAMILY MEDICINE

## 2018-10-02 PROCEDURE — 90686 IIV4 VACC NO PRSV 0.5 ML IM: CPT | Performed by: FAMILY MEDICINE

## 2018-10-02 PROCEDURE — 85025 COMPLETE CBC W/AUTO DIFF WBC: CPT | Performed by: FAMILY MEDICINE

## 2018-10-02 NOTE — PROGRESS NOTES
HPI:   Cuco Daniels is a 34year old female who presents for a complete physical exam.  Patient complains of stress leading to her spiraling into depressive symptoms. She would like to discuss this.   She's stressed at home namely, not sure what to do Problem Relation Age of Onset   • Diabetes Father    • Lipids Father    • Hypertension Father    • Hypertension Mother    • Lipids Mother    • Heart Disorder Paternal Grandmother         stroke      Social History:   Social History    Tobacco Use      Sm murmur  GI: good BS's,no masses, HSM or tenderness  :introitus is normal, no pathologic appearing discharge,cervix is grossly normal,no adnexal masses or tenderness  MUSCULOSKELETAL: back is not tender, FROM of UEs and LEs bilaterally  EXTREMITIES: no cy HIGH RISK , THIN PREP COLLECTION  - THINPREP PAP SMEAR ONLY; Future  - THINPREP PAP SMEAR ONLY    9. Lipid screening    - VENIPUNCTURE  - LIPID PANEL; Future  - LIPID PANEL    10.  Screening for deficiency anemia    - VENIPUNCTURE  - CBC WITH DIFFERENTIAL W

## 2018-10-05 ENCOUNTER — TELEPHONE (OUTPATIENT)
Dept: FAMILY MEDICINE CLINIC | Facility: CLINIC | Age: 33
End: 2018-10-05

## 2018-10-05 NOTE — TELEPHONE ENCOUNTER
Patient has been notified, verbalized understanding of information. Denies further questions. Pt needs to have her thyroid labs checked in 6-8 weeks, recall placed for this and a1c to be checked in 6 months, recall placed for this as well.

## 2018-10-05 NOTE — TELEPHONE ENCOUNTER
----- Message from Lamberto Holloway MD sent at 10/5/2018 12:58 PM CDT -----  Please notify patient this is very odd, but once again the lab said they could not see the cells adequately to get a read on her PAP.   I've never seen this happen a few years rosangela  Ro

## 2018-10-05 NOTE — TELEPHONE ENCOUNTER
----- Message from Richard Cagle MD sent at 10/5/2018 11:26 AM CDT -----  I don't see that patient has read my last results not, and I have more to add.   Here's the last one: Jimmy Rivas,     Good news.  Your labs look good overall, including blood counts,

## 2018-11-27 ENCOUNTER — TELEPHONE (OUTPATIENT)
Dept: FAMILY MEDICINE CLINIC | Facility: CLINIC | Age: 33
End: 2018-11-27

## 2018-11-27 NOTE — TELEPHONE ENCOUNTER
Per pt reminder pt is due for thyroid labs (6-8 weeks), letter mailed to patient reminding her to schedule appt.

## 2018-11-30 RX ORDER — ERGOCALCIFEROL 1.25 MG/1
50000 CAPSULE ORAL
Qty: 6 CAPSULE | Refills: 3 | Status: SHIPPED | OUTPATIENT
Start: 2018-11-30 | End: 2019-02-26

## 2018-11-30 NOTE — TELEPHONE ENCOUNTER
Last refill on ergocalciferol #8 with 3 refills on 9 26 2018   Last Vitamin D level  5 11 2018 - 41.0

## 2018-12-02 DIAGNOSIS — E06.3 HYPOTHYROIDISM DUE TO HASHIMOTO'S THYROIDITIS: Primary | ICD-10-CM

## 2018-12-02 DIAGNOSIS — E03.8 HYPOTHYROIDISM DUE TO HASHIMOTO'S THYROIDITIS: Primary | ICD-10-CM

## 2018-12-03 RX ORDER — LEVOTHYROXINE SODIUM 0.05 MG/1
TABLET ORAL
Qty: 90 TABLET | Refills: 0 | Status: SHIPPED | OUTPATIENT
Start: 2018-12-03 | End: 2019-03-03

## 2018-12-03 NOTE — TELEPHONE ENCOUNTER
Last refilled on 9/4/18 for # 90 with 0 refills  Last TSH 5.590 on 10/2/18  Last OV 10/2/18  Future Appointments   Date Time Provider Olga Jarvis   12/4/2018  4:00 PM Paolo Kaplan, APRN [facility]        Thank you.

## 2019-01-02 ENCOUNTER — PATIENT OUTREACH (OUTPATIENT)
Dept: FAMILY MEDICINE CLINIC | Facility: CLINIC | Age: 34
End: 2019-01-02

## 2019-02-22 RX ORDER — ESCITALOPRAM OXALATE 20 MG/1
20 TABLET ORAL
Qty: 90 TABLET | Refills: 1 | Status: SHIPPED | OUTPATIENT
Start: 2019-02-22 | End: 2019-08-27

## 2019-02-26 RX ORDER — ERGOCALCIFEROL 1.25 MG/1
50000 CAPSULE ORAL
Qty: 6 CAPSULE | Refills: 3 | Status: SHIPPED | OUTPATIENT
Start: 2019-02-26 | End: 2019-10-28

## 2019-03-04 RX ORDER — LEVOTHYROXINE SODIUM 0.05 MG/1
TABLET ORAL
Qty: 90 TABLET | Refills: 0 | Status: SHIPPED | OUTPATIENT
Start: 2019-03-04 | End: 2019-07-17 | Stop reason: ALTCHOICE

## 2019-03-04 NOTE — TELEPHONE ENCOUNTER
Last refilled on 12/3/18 for # 90 with 0 refills  Last TSH 5.590 on 10/2/18  Last OV 10/2/18  No future appointments. Thank you.

## 2019-04-01 ENCOUNTER — PATIENT MESSAGE (OUTPATIENT)
Dept: FAMILY MEDICINE CLINIC | Facility: CLINIC | Age: 34
End: 2019-04-01

## 2019-04-01 NOTE — TELEPHONE ENCOUNTER
From: Trevin Savage  To: Yessy Dumont MD  Sent: 4/1/2019 8:26 AM CDT  Subject: Other    Hi Dr. Mare Stover,  Can you please provide a letter for me about my migraines?  I got a bad one on Saturday (3/30) and I was supposed to go to a concert but the migraine

## 2019-04-05 ENCOUNTER — TELEPHONE (OUTPATIENT)
Dept: FAMILY MEDICINE CLINIC | Facility: CLINIC | Age: 34
End: 2019-04-05

## 2019-04-05 DIAGNOSIS — R73.03 PRE-DIABETES: Primary | ICD-10-CM

## 2019-04-15 RX ORDER — SUMATRIPTAN 50 MG/1
50 TABLET, FILM COATED ORAL EVERY 2 HOUR PRN
Qty: 9 TABLET | Refills: 1 | Status: SHIPPED | OUTPATIENT
Start: 2019-04-15

## 2019-04-15 NOTE — TELEPHONE ENCOUNTER
Last refilled on 6/11/16 for # 9 with 1 refills  Last OV 10/2/18  Future Appointments   Date Time Provider Olga Jarvis   5/3/2019  3:15 PM Candy Givens MD [facility] JASE Bassett        Thank you.

## 2019-05-03 ENCOUNTER — OFFICE VISIT (OUTPATIENT)
Dept: FAMILY MEDICINE CLINIC | Facility: CLINIC | Age: 34
End: 2019-05-03

## 2019-05-03 VITALS
BODY MASS INDEX: 32 KG/M2 | RESPIRATION RATE: 16 BRPM | OXYGEN SATURATION: 98 % | WEIGHT: 180 LBS | TEMPERATURE: 99 F | SYSTOLIC BLOOD PRESSURE: 110 MMHG | HEART RATE: 93 BPM | DIASTOLIC BLOOD PRESSURE: 70 MMHG

## 2019-05-03 DIAGNOSIS — R51.9 INCREASED SEVERITY OF HEADACHES: ICD-10-CM

## 2019-05-03 DIAGNOSIS — M72.2 PLANTAR FASCIITIS, BILATERAL: ICD-10-CM

## 2019-05-03 DIAGNOSIS — E06.3 HYPOTHYROIDISM DUE TO HASHIMOTO'S THYROIDITIS: ICD-10-CM

## 2019-05-03 DIAGNOSIS — R51.9 INCREASED FREQUENCY OF HEADACHES: Primary | ICD-10-CM

## 2019-05-03 DIAGNOSIS — F34.1 DYSTHYMIA: ICD-10-CM

## 2019-05-03 DIAGNOSIS — R73.03 PRE-DIABETES: ICD-10-CM

## 2019-05-03 DIAGNOSIS — E03.8 HYPOTHYROIDISM DUE TO HASHIMOTO'S THYROIDITIS: ICD-10-CM

## 2019-05-03 PROCEDURE — 99215 OFFICE O/P EST HI 40 MIN: CPT | Performed by: FAMILY MEDICINE

## 2019-05-03 PROCEDURE — 84443 ASSAY THYROID STIM HORMONE: CPT | Performed by: FAMILY MEDICINE

## 2019-05-03 PROCEDURE — 84439 ASSAY OF FREE THYROXINE: CPT | Performed by: FAMILY MEDICINE

## 2019-05-03 PROCEDURE — 83036 HEMOGLOBIN GLYCOSYLATED A1C: CPT | Performed by: FAMILY MEDICINE

## 2019-05-03 RX ORDER — BUPROPION HYDROCHLORIDE 150 MG/1
TABLET ORAL
Qty: 60 TABLET | Refills: 0 | Status: SHIPPED | OUTPATIENT
Start: 2019-05-03 | End: 2019-06-06 | Stop reason: ALTCHOICE

## 2019-05-03 NOTE — PROGRESS NOTES
Deja Chamberlain is a 28year old female. HPI:   Patient has several things to discuss:    1) mood is sometimes good, sometimes not. Today's a good day--things aren't bothering her, doesn't get annoyed so easily, has better thoughts.   Bad days she's \"e capsule Rfl: 3   escitalopram 20 MG Oral Tab Take 1 tablet (20 mg total) by mouth once daily.  Disp: 90 tablet Rfl: 1        HISTORY:  Past Medical History:   Diagnosis Date   • Migraine    • MRSA infection 2006    leg      Past Surgical History:   Procedur stop wearing her flat shoes every day (every other day at most), try heel lift, cont ice and stretching and if not helping go to podiatry  -     PODIATRY - INTERNAL    Hypothyroidism due to Hashimoto's thyroiditis--assess control today   -     TSH W REFLEX

## 2019-05-05 ENCOUNTER — PATIENT MESSAGE (OUTPATIENT)
Dept: FAMILY MEDICINE CLINIC | Facility: CLINIC | Age: 34
End: 2019-05-05

## 2019-05-05 DIAGNOSIS — E06.3 HYPOTHYROIDISM DUE TO HASHIMOTO'S THYROIDITIS: Primary | ICD-10-CM

## 2019-05-05 DIAGNOSIS — E03.8 HYPOTHYROIDISM DUE TO HASHIMOTO'S THYROIDITIS: Primary | ICD-10-CM

## 2019-05-06 NOTE — TELEPHONE ENCOUNTER
From: Debra Lucas  To: Haiely Le MD  Sent: 5/5/2019 10:57 AM CDT  Subject: Test Results Question    Hi Dr Giulia Fierro - I got your message about test results. Glad to hear the blood sugar is still ok n lower than last time.  About the thyroid meds - yes

## 2019-05-07 RX ORDER — LEVOTHYROXINE SODIUM 0.07 MG/1
75 TABLET ORAL
Qty: 90 TABLET | Refills: 1 | Status: SHIPPED | OUTPATIENT
Start: 2019-05-07 | End: 2019-11-03

## 2019-05-09 ENCOUNTER — HOSPITAL ENCOUNTER (OUTPATIENT)
Dept: MRI IMAGING | Age: 34
Discharge: HOME | End: 2019-05-09
Attending: FAMILY MEDICINE
Payer: COMMERCIAL

## 2019-05-09 DIAGNOSIS — R51.9 INCREASED SEVERITY OF HEADACHES: ICD-10-CM

## 2019-05-09 DIAGNOSIS — R51.9 INCREASED FREQUENCY OF HEADACHES: ICD-10-CM

## 2019-05-09 PROCEDURE — A9575 INJ GADOTERATE MEGLUMI 0.1ML: HCPCS | Performed by: FAMILY MEDICINE

## 2019-05-09 PROCEDURE — 70553 MRI BRAIN STEM W/O & W/DYE: CPT | Performed by: FAMILY MEDICINE

## 2019-05-10 ENCOUNTER — PATIENT MESSAGE (OUTPATIENT)
Dept: FAMILY MEDICINE CLINIC | Facility: CLINIC | Age: 34
End: 2019-05-10

## 2019-05-10 DIAGNOSIS — R51.9 FREQUENT HEADACHES: ICD-10-CM

## 2019-05-10 DIAGNOSIS — G43.709 CHRONIC MIGRAINE WITHOUT AURA WITHOUT STATUS MIGRAINOSUS, NOT INTRACTABLE: ICD-10-CM

## 2019-05-10 DIAGNOSIS — R06.83 SNORING: Primary | ICD-10-CM

## 2019-05-10 NOTE — TELEPHONE ENCOUNTER
From: Lora Lopez  To: Javi Fernandez MD  Sent: 5/10/2019 9:18 AM CDT  Subject: Test Results Question    Hi Dr Andrew Patel - I got your message about MRI results. I’m very happy to hear that it came out great and no major issues.  However I do wish it could

## 2019-06-06 RX ORDER — BUPROPION HYDROCHLORIDE 150 MG/1
TABLET ORAL
Qty: 145 TABLET | Refills: 0 | OUTPATIENT
Start: 2019-06-06

## 2019-06-06 RX ORDER — LEVOTHYROXINE SODIUM 0.05 MG/1
TABLET ORAL
Qty: 90 TABLET | Refills: 0 | OUTPATIENT
Start: 2019-06-06

## 2019-06-06 RX ORDER — BUPROPION HYDROCHLORIDE 300 MG/1
300 TABLET ORAL DAILY
Qty: 90 TABLET | Refills: 1 | Status: SHIPPED | OUTPATIENT
Start: 2019-06-06 | End: 2019-12-01

## 2019-06-06 NOTE — TELEPHONE ENCOUNTER
Bupropion:  Last refill: 5/03/2019 #60 with 0 refills    Levothyroxine:  Last refill: 5/07/2019 #90 with 1 refill    Last Visit: 5/03/2019   Next Visit:    No Future Appointments         Forward to Dr. Sasha Pérez please advise on refills. Thanks.

## 2019-06-12 ENCOUNTER — OFFICE VISIT (OUTPATIENT)
Dept: FAMILY MEDICINE CLINIC | Facility: CLINIC | Age: 34
End: 2019-06-12

## 2019-06-12 VITALS
HEART RATE: 84 BPM | RESPIRATION RATE: 16 BRPM | SYSTOLIC BLOOD PRESSURE: 100 MMHG | BODY MASS INDEX: 32.44 KG/M2 | HEIGHT: 62.5 IN | TEMPERATURE: 98 F | WEIGHT: 180.81 LBS | DIASTOLIC BLOOD PRESSURE: 80 MMHG

## 2019-06-12 DIAGNOSIS — L05.91 PILONIDAL CYST: Primary | ICD-10-CM

## 2019-06-12 PROCEDURE — 99213 OFFICE O/P EST LOW 20 MIN: CPT | Performed by: FAMILY MEDICINE

## 2019-06-12 RX ORDER — SULFAMETHOXAZOLE AND TRIMETHOPRIM 800; 160 MG/1; MG/1
1 TABLET ORAL 2 TIMES DAILY
Qty: 20 TABLET | Refills: 0 | Status: SHIPPED | OUTPATIENT
Start: 2019-06-12 | End: 2019-06-17

## 2019-06-12 NOTE — PROGRESS NOTES
David Teresa is a 28year old female. HPI:   Patient concerned about possible MRSA, had it once before years ago, and feeling pain and swelling near butt. No drainage. No injury. No fevers/chills. Started several days ago.     Current Outpatient SEMAJ exertion  CARDIOVASCULAR: denies chest pain on exertion  GI: denies abdominal pain/n/v  NEURO: denies headaches    EXAM:   /80   Pulse 84   Temp 98.1 °F (36.7 °C) (Temporal)   Resp 16   Ht 62.5\"   Wt 180 lb 12.8 oz   LMP 06/10/2019   BMI 32.54 kg/m²

## 2019-06-17 ENCOUNTER — OFFICE VISIT (OUTPATIENT)
Dept: FAMILY MEDICINE CLINIC | Facility: CLINIC | Age: 34
End: 2019-06-17

## 2019-06-17 VITALS
RESPIRATION RATE: 16 BRPM | DIASTOLIC BLOOD PRESSURE: 60 MMHG | BODY MASS INDEX: 33 KG/M2 | SYSTOLIC BLOOD PRESSURE: 108 MMHG | HEIGHT: 62.5 IN | TEMPERATURE: 98 F | HEART RATE: 86 BPM

## 2019-06-17 DIAGNOSIS — L05.91 PILONIDAL CYST: Primary | ICD-10-CM

## 2019-06-17 PROCEDURE — 87205 SMEAR GRAM STAIN: CPT | Performed by: FAMILY MEDICINE

## 2019-06-17 PROCEDURE — 87070 CULTURE OTHR SPECIMN AEROBIC: CPT | Performed by: FAMILY MEDICINE

## 2019-06-17 PROCEDURE — 87077 CULTURE AEROBIC IDENTIFY: CPT | Performed by: FAMILY MEDICINE

## 2019-06-17 PROCEDURE — 87186 SC STD MICRODIL/AGAR DIL: CPT | Performed by: FAMILY MEDICINE

## 2019-06-17 PROCEDURE — 10080 I&D PILONIDAL CYST SIMPLE: CPT | Performed by: FAMILY MEDICINE

## 2019-06-17 NOTE — PROGRESS NOTES
Alfredo Matson is a 28year old female. HPI:   Patient here to f/u on her pilonidal cyst. At least visit there was no fluctuance, quite indurated, tri warm compress and oral abx, but it doesn't seem to be helping much, may have gotten larger.   No fever (36.5 °C) (Temporal)   Resp 16   Ht 62.5\"   LMP 06/10/2019   BMI 32.54 kg/m²   GENERAL: well developed, well nourished,in no apparent distress  SKIN: n~2-2.5cm diameter irregulary shapred red lump with indurantion and fluctuance    Clensed area with alcoh

## 2019-06-18 ENCOUNTER — TELEPHONE (OUTPATIENT)
Dept: FAMILY MEDICINE CLINIC | Facility: CLINIC | Age: 34
End: 2019-06-18
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
## 2019-06-22 ENCOUNTER — OFFICE VISIT (OUTPATIENT)
Dept: FAMILY MEDICINE CLINIC | Facility: CLINIC | Age: 34
End: 2019-06-22

## 2019-06-22 VITALS
BODY MASS INDEX: 32.3 KG/M2 | RESPIRATION RATE: 16 BRPM | HEART RATE: 82 BPM | SYSTOLIC BLOOD PRESSURE: 116 MMHG | WEIGHT: 180 LBS | TEMPERATURE: 98 F | DIASTOLIC BLOOD PRESSURE: 70 MMHG | HEIGHT: 62.5 IN

## 2019-06-22 DIAGNOSIS — L05.91 PILONIDAL CYST: Primary | ICD-10-CM

## 2019-06-22 PROCEDURE — 99213 OFFICE O/P EST LOW 20 MIN: CPT | Performed by: FAMILY MEDICINE

## 2019-06-22 NOTE — PROGRESS NOTES
David Teresa is a 28year old female. HPI:   Patient here to f/u on her I&D of pilonidal cyst earlier this week. Symptoms of pain gradually improved as te week went on, today feeling well, can sit with minimal to no nia.  Had a lot of gunky white d oz      Comment: 2 to 3 glasess of wine or beer/week    Drug use: No         REVIEW OF SYSTEMS:   GENERAL HEALTH: feels well otherwise  SKIN: flat fine red rash on forearms past day coming and going  RESPIRATORY: denies shortness of breath with exertion  C

## 2019-07-05 ENCOUNTER — HOSPITAL ENCOUNTER (OUTPATIENT)
Age: 34
Discharge: HOME | End: 2019-07-05
Attending: FAMILY MEDICINE
Payer: COMMERCIAL

## 2019-07-05 ENCOUNTER — TELEPHONE (OUTPATIENT)
Dept: FAMILY MEDICINE CLINIC | Facility: CLINIC | Age: 34
End: 2019-07-05

## 2019-07-05 VITALS
HEART RATE: 99 BPM | SYSTOLIC BLOOD PRESSURE: 107 MMHG | TEMPERATURE: 98 F | DIASTOLIC BLOOD PRESSURE: 75 MMHG | WEIGHT: 180 LBS | OXYGEN SATURATION: 98 % | BODY MASS INDEX: 32 KG/M2 | RESPIRATION RATE: 16 BRPM

## 2019-07-05 DIAGNOSIS — L03.317 CELLULITIS OF BUTTOCK, RIGHT: Primary | ICD-10-CM

## 2019-07-05 PROCEDURE — 99213 OFFICE O/P EST LOW 20 MIN: CPT

## 2019-07-05 PROCEDURE — 99204 OFFICE O/P NEW MOD 45 MIN: CPT

## 2019-07-05 RX ORDER — CLINDAMYCIN HYDROCHLORIDE 300 MG/1
300 CAPSULE ORAL 3 TIMES DAILY
Qty: 30 CAPSULE | Refills: 0 | Status: SHIPPED | OUTPATIENT
Start: 2019-07-05 | End: 2019-07-15

## 2019-07-05 NOTE — TELEPHONE ENCOUNTER
Call from patient. States yesterday or day before she noticed a new red bump on her back, a little above where her pilonidal cyst was. States it is painful, and hot to the touch and feels hard. States that she also has nausea and loss of appetite.     Has a

## 2019-07-05 NOTE — TELEPHONE ENCOUNTER
Pt called, states that she thinks the infection she previously had is back as she has a bump in the same area that really hurts. Pt would like to be seen today.   Please call pt at [phone]

## 2019-07-05 NOTE — ED INITIAL ASSESSMENT (HPI)
Patient refused shower today, stated he would take a shower tonight. Incision sites cleaned with hibiclens. Pt was treated for a cyst in June and treated with antibiotics.   Pt states now has another cyst that is painful in same area

## 2019-07-05 NOTE — TELEPHONE ENCOUNTER
Patient advised, Dr. Nicolás Jenkins is not in the office today. She will go to the  in [facility].

## 2019-07-05 NOTE — TELEPHONE ENCOUNTER
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

## 2019-07-05 NOTE — ED PROVIDER NOTES
Patient Seen in: [id number] [facility]    History   Patient presents with:  Cyst    Stated Complaint: staph infection problems    HPI    **27-year-old female presents to the immediate care today with chief complaints of possible buttock abscess tenderness  Heart: S1, S2 normal, no murmur, click, rub or gallop, regular rate and rhythm  Abdomen: soft, non-tender; bowel sounds normal; no masses,  no organomegaly  Extremities: extremities normal, atraumatic, no cyanosis or edema  Pulses: 2+ and symme

## 2019-07-10 ENCOUNTER — OFFICE VISIT (OUTPATIENT)
Dept: FAMILY MEDICINE CLINIC | Facility: CLINIC | Age: 34
End: 2019-07-10

## 2019-07-10 VITALS
BODY MASS INDEX: 32 KG/M2 | DIASTOLIC BLOOD PRESSURE: 70 MMHG | TEMPERATURE: 97 F | RESPIRATION RATE: 12 BRPM | HEART RATE: 68 BPM | SYSTOLIC BLOOD PRESSURE: 104 MMHG | WEIGHT: 176 LBS

## 2019-07-10 DIAGNOSIS — L73.9 FOLLICULITIS: ICD-10-CM

## 2019-07-10 DIAGNOSIS — L05.91 PILONIDAL CYST: Primary | ICD-10-CM

## 2019-07-10 PROCEDURE — 99213 OFFICE O/P EST LOW 20 MIN: CPT | Performed by: FAMILY MEDICINE

## 2019-07-10 RX ORDER — MUPIROCIN CALCIUM 20 MG/G
1 CREAM TOPICAL 3 TIMES DAILY
Qty: 30 G | Refills: 0 | Status: SHIPPED | OUTPATIENT
Start: 2019-07-10 | End: 2019-07-20

## 2019-07-10 NOTE — PROGRESS NOTES
Luzma Barber is a 28year old female. HPI:   Patient reports the lesion we I&Ded went away/cleared up, was fine for about a week, but then last wed a new bumpcyst appeared just above the one we I&Ded.  Went to [facility] on July 5th and put on abx, no abscess Lipids Father    • Hypertension Father    • Hypertension Mother    • Lipids Mother    • Heart Disorder Paternal Grandmother         stroke      Social History    Tobacco Use      Smoking status: Never Smoker      Smokeless tobacco: Never Used    Alcohol us

## 2019-07-17 ENCOUNTER — OFFICE VISIT (OUTPATIENT)
Dept: SURGERY | Facility: CLINIC | Age: 34
End: 2019-07-17

## 2019-07-17 VITALS
BODY MASS INDEX: 33.13 KG/M2 | TEMPERATURE: 98 F | HEART RATE: 69 BPM | HEIGHT: 62 IN | DIASTOLIC BLOOD PRESSURE: 67 MMHG | SYSTOLIC BLOOD PRESSURE: 103 MMHG | WEIGHT: 180 LBS

## 2019-07-17 DIAGNOSIS — B96.89 CHRONIC BACTERIAL SKIN INFECTION: Primary | ICD-10-CM

## 2019-07-17 DIAGNOSIS — L08.89 CHRONIC BACTERIAL SKIN INFECTION: Primary | ICD-10-CM

## 2019-07-17 PROCEDURE — 99244 OFF/OP CNSLTJ NEW/EST MOD 40: CPT | Performed by: SURGERY

## 2019-07-17 RX ORDER — DOXYCYCLINE HYCLATE 100 MG/1
100 CAPSULE ORAL 2 TIMES DAILY
Qty: 20 CAPSULE | Refills: 2 | Status: SHIPPED | OUTPATIENT
Start: 2019-07-17 | End: 2020-02-04

## 2019-07-17 NOTE — H&P
New Patient Visit Note       Active Problems      No diagnosis found. Chief Complaint   Patient presents with:  Pilonidal Cyst: NP. Referred by Dr. Melvin Colmenares.  consult Staph infection/ pilonidal cyst      History of Present Illness     Patient presents with n Alcohol/week: 0.0 oz        Comment: 2 to 3 glasess of wine or beer/week      Drug use: No    Other Topics      Concerns:       Current Outpatient Medications:   •  Mupirocin Calcium 2 % External Cream, Apply 1 Application topically 3 (three) times daily f weakness. Hematological: Negative for adenopathy. Does not bruise/bleed easily. Psychiatric/Behavioral: Negative for behavioral problems and sleep disturbance. Physical Findings   There were no vitals taken for this visit.   Physical Exam     Gene

## 2019-07-31 ENCOUNTER — OFFICE VISIT (OUTPATIENT)
Dept: SURGERY | Facility: CLINIC | Age: 34
End: 2019-07-31

## 2019-07-31 VITALS
HEIGHT: 62.5 IN | WEIGHT: 180 LBS | TEMPERATURE: 97 F | BODY MASS INDEX: 32.3 KG/M2 | HEART RATE: 68 BPM | DIASTOLIC BLOOD PRESSURE: 76 MMHG | SYSTOLIC BLOOD PRESSURE: 117 MMHG

## 2019-07-31 DIAGNOSIS — B96.89 CHRONIC BACTERIAL SKIN INFECTION: Primary | ICD-10-CM

## 2019-07-31 DIAGNOSIS — L08.89 CHRONIC BACTERIAL SKIN INFECTION: Primary | ICD-10-CM

## 2019-07-31 PROCEDURE — 99213 OFFICE O/P EST LOW 20 MIN: CPT | Performed by: SURGERY

## 2019-07-31 NOTE — PROGRESS NOTES
[facility]  Progress Note    Delroy Light Patient Status:  No patient class for patient encounter    1985 MRN [id number]   Location [address] [place] [address],[address], [address] [facility] Attending No att. providers found   Hosp Day # 0 PCP Jenni Potter was explained to the patient and the family.         DO JASE Moran General Surgery  7/31/2019  12:19 PM

## 2019-08-05 ENCOUNTER — TELEPHONE (OUTPATIENT)
Dept: FAMILY MEDICINE CLINIC | Facility: CLINIC | Age: 34
End: 2019-08-05

## 2019-08-19 ENCOUNTER — TELEPHONE (OUTPATIENT)
Dept: FAMILY MEDICINE CLINIC | Facility: CLINIC | Age: 34
End: 2019-08-19

## 2019-08-19 DIAGNOSIS — K00.2 TOOTH ABNORMAL SHAPE: Primary | ICD-10-CM

## 2019-08-23 ENCOUNTER — MED REC SCAN ONLY (OUTPATIENT)
Dept: FAMILY MEDICINE CLINIC | Facility: CLINIC | Age: 34
End: 2019-08-23

## 2019-08-23 NOTE — TELEPHONE ENCOUNTER
My chart sent to the pt advising that the referral is authorized for the oral surgeon consultation    Faxed the referral to Dr. Kathleen gomez

## 2019-08-27 NOTE — TELEPHONE ENCOUNTER
Last refill: 2/22/2019 #90 with 1 refill  Last Visit: 7/10/2019   Next Visit: No future appointments. Forward to Dr. Gio Cleveland please advise on refills. Thanks.

## 2019-08-28 RX ORDER — ESCITALOPRAM OXALATE 20 MG/1
TABLET ORAL
Qty: 90 TABLET | Refills: 1 | Status: SHIPPED | OUTPATIENT
Start: 2019-08-28 | End: 2020-03-09

## 2019-09-09 ENCOUNTER — TELEPHONE (OUTPATIENT)
Dept: FAMILY MEDICINE CLINIC | Facility: CLINIC | Age: 34
End: 2019-09-09

## 2019-09-09 DIAGNOSIS — K01.1 IMPACTED TOOTH: Primary | ICD-10-CM

## 2019-09-09 NOTE — TELEPHONE ENCOUNTER
Serge Beckwith from Dr Mike Alcantar is returning a call from [facility] in regards to an insurance question. Please call Deanne back.

## 2019-09-09 NOTE — TELEPHONE ENCOUNTER
Spoke with Lavelle Leroy and requested clinical notes as well as CPT codes to process referral.   Lavelle Leroy stated insurance would most likely not cover. Advised we will enter referral for the record. Awaiting notes.

## 2019-09-09 NOTE — TELEPHONE ENCOUNTER
Adrianna Hudson advised insurance will most likely not cover this. Advised we still need to enter referral for the record. Routing to Dr. Milena King. Dr. Fuad Carrasco note in bin. Please advise. Referral pending.

## 2019-09-09 NOTE — TELEPHONE ENCOUNTER
Referral pending. Letter from Dr. Yogesh Payton faxed to [facility] at [phone]. Will await outcome.

## 2019-09-09 NOTE — TELEPHONE ENCOUNTER
Duyen Ames from Dr. Villar Exon office returning nurse call.  tooth removal was only a partial bony. So, insurance probably will not cover.

## 2019-09-10 NOTE — TELEPHONE ENCOUNTER
The office note from Dr. Mana Nash office states that the pt has impacted teeth- but the notes from the pt state that the surgeon told her medical would not cover this procedure see "[employer] message 8/28/19    Need to clarify        Left a message for Dr. Beau Mcbride

## 2019-09-11 NOTE — TELEPHONE ENCOUNTER
Spoke with [facility] at Dr. Rios Horse office and she states that the tooth was only partially impacted and she sent me the corrected office note- advised that medical insurance will not cover.  She is aware and the pt is aware  No need for a referral as the pt h

## 2019-10-28 RX ORDER — ERGOCALCIFEROL 1.25 MG/1
CAPSULE ORAL
Qty: 6 CAPSULE | Refills: 0 | Status: SHIPPED | OUTPATIENT
Start: 2019-10-28 | End: 2020-01-27

## 2019-10-28 NOTE — TELEPHONE ENCOUNTER
Last refill on Ergocalciferol #6 with 3 refills on 2 26 2019   Last Vitamin D level on 5 11 208- 41.0   Last OV on 7 10 2019 - Sick visit

## 2019-11-03 DIAGNOSIS — E03.8 HYPOTHYROIDISM DUE TO HASHIMOTO'S THYROIDITIS: ICD-10-CM

## 2019-11-03 DIAGNOSIS — E06.3 HYPOTHYROIDISM DUE TO HASHIMOTO'S THYROIDITIS: ICD-10-CM

## 2019-11-04 RX ORDER — LEVOTHYROXINE SODIUM 0.07 MG/1
TABLET ORAL
Qty: 90 TABLET | Refills: 1 | Status: SHIPPED | OUTPATIENT
Start: 2019-11-04 | End: 2020-05-01

## 2019-12-02 RX ORDER — BUPROPION HYDROCHLORIDE 300 MG/1
TABLET ORAL
Qty: 90 TABLET | Refills: 0 | Status: SHIPPED | OUTPATIENT
Start: 2019-12-02 | End: 2020-03-05

## 2020-01-27 RX ORDER — ERGOCALCIFEROL 1.25 MG/1
CAPSULE ORAL
Qty: 6 CAPSULE | Refills: 0 | Status: SHIPPED | OUTPATIENT
Start: 2020-01-27 | End: 2020-05-01

## 2020-02-04 ENCOUNTER — OFFICE VISIT (OUTPATIENT)
Dept: FAMILY MEDICINE CLINIC | Facility: CLINIC | Age: 35
End: 2020-02-04

## 2020-02-04 VITALS
DIASTOLIC BLOOD PRESSURE: 80 MMHG | HEART RATE: 70 BPM | HEIGHT: 62.25 IN | BODY MASS INDEX: 31.29 KG/M2 | SYSTOLIC BLOOD PRESSURE: 110 MMHG | TEMPERATURE: 99 F | WEIGHT: 172.19 LBS | RESPIRATION RATE: 14 BRPM

## 2020-02-04 DIAGNOSIS — Z13.220 LIPID SCREENING: ICD-10-CM

## 2020-02-04 DIAGNOSIS — R21 SKIN RASH: Primary | ICD-10-CM

## 2020-02-04 DIAGNOSIS — E06.3 HYPOTHYROIDISM DUE TO HASHIMOTO'S THYROIDITIS: ICD-10-CM

## 2020-02-04 DIAGNOSIS — E03.8 HYPOTHYROIDISM DUE TO HASHIMOTO'S THYROIDITIS: ICD-10-CM

## 2020-02-04 DIAGNOSIS — E55.9 VITAMIN D DEFICIENCY: ICD-10-CM

## 2020-02-04 LAB
ALBUMIN SERPL-MCNC: 3.9 G/DL (ref 3.4–5)
ALBUMIN/GLOB SERPL: 1.1 {RATIO} (ref 1–2)
ALP LIVER SERPL-CCNC: 62 U/L (ref 37–98)
ALT SERPL-CCNC: 27 U/L (ref 13–56)
ANION GAP SERPL CALC-SCNC: 6 MMOL/L (ref 0–18)
AST SERPL-CCNC: 13 U/L (ref 15–37)
BASOPHILS # BLD AUTO: 0.03 X10(3) UL (ref 0–0.2)
BASOPHILS NFR BLD AUTO: 0.5 %
BILIRUB SERPL-MCNC: 0.2 MG/DL (ref 0.1–2)
BUN BLD-MCNC: 12 MG/DL (ref 7–18)
BUN/CREAT SERPL: 16.2 (ref 10–20)
CALCIUM BLD-MCNC: 9.2 MG/DL (ref 8.5–10.1)
CHLORIDE SERPL-SCNC: 111 MMOL/L (ref 98–112)
CHOLEST SMN-MCNC: 220 MG/DL (ref ?–200)
CK SERPL-CCNC: 102 U/L (ref 26–192)
CO2 SERPL-SCNC: 23 MMOL/L (ref 21–32)
CREAT BLD-MCNC: 0.74 MG/DL (ref 0.55–1.02)
DEPRECATED RDW RBC AUTO: 40.6 FL (ref 35.1–46.3)
EOSINOPHIL # BLD AUTO: 0.11 X10(3) UL (ref 0–0.7)
EOSINOPHIL NFR BLD AUTO: 2 %
ERYTHROCYTE [DISTWIDTH] IN BLOOD BY AUTOMATED COUNT: 13.1 % (ref 11–15)
GLOBULIN PLAS-MCNC: 3.7 G/DL (ref 2.8–4.4)
GLUCOSE BLD-MCNC: 113 MG/DL (ref 70–99)
HCT VFR BLD AUTO: 39 % (ref 35–48)
HDLC SERPL-MCNC: 56 MG/DL (ref 40–59)
HGB BLD-MCNC: 12.7 G/DL (ref 12–16)
IMM GRANULOCYTES # BLD AUTO: 0.01 X10(3) UL (ref 0–1)
IMM GRANULOCYTES NFR BLD: 0.2 %
LDLC SERPL CALC-MCNC: 124 MG/DL (ref ?–100)
LYMPHOCYTES # BLD AUTO: 1.62 X10(3) UL (ref 1–4)
LYMPHOCYTES NFR BLD AUTO: 29.1 %
M PROTEIN MFR SERPL ELPH: 7.6 G/DL (ref 6.4–8.2)
MCH RBC QN AUTO: 28.2 PG (ref 26–34)
MCHC RBC AUTO-ENTMCNC: 32.6 G/DL (ref 31–37)
MCV RBC AUTO: 86.7 FL (ref 80–100)
MONOCYTES # BLD AUTO: 0.55 X10(3) UL (ref 0.1–1)
MONOCYTES NFR BLD AUTO: 9.9 %
NEUTROPHILS # BLD AUTO: 3.25 X10 (3) UL (ref 1.5–7.7)
NEUTROPHILS # BLD AUTO: 3.25 X10(3) UL (ref 1.5–7.7)
NEUTROPHILS NFR BLD AUTO: 58.3 %
NONHDLC SERPL-MCNC: 164 MG/DL (ref ?–130)
OSMOLALITY SERPL CALC.SUM OF ELEC: 291 MOSM/KG (ref 275–295)
PATIENT FASTING Y/N/NP: NO
PATIENT FASTING Y/N/NP: NO
PLATELET # BLD AUTO: 294 10(3)UL (ref 150–450)
POTASSIUM SERPL-SCNC: 3.9 MMOL/L (ref 3.5–5.1)
RBC # BLD AUTO: 4.5 X10(6)UL (ref 3.8–5.3)
SODIUM SERPL-SCNC: 140 MMOL/L (ref 136–145)
TRIGL SERPL-MCNC: 202 MG/DL (ref 30–149)
TSI SER-ACNC: 3.62 MIU/ML (ref 0.36–3.74)
VIT D+METAB SERPL-MCNC: 43.1 NG/ML (ref 30–100)
VLDLC SERPL CALC-MCNC: 40 MG/DL (ref 0–30)
WBC # BLD AUTO: 5.6 X10(3) UL (ref 4–11)

## 2020-02-04 PROCEDURE — 99214 OFFICE O/P EST MOD 30 MIN: CPT | Performed by: FAMILY MEDICINE

## 2020-02-04 PROCEDURE — 82306 VITAMIN D 25 HYDROXY: CPT | Performed by: FAMILY MEDICINE

## 2020-02-04 PROCEDURE — 86038 ANTINUCLEAR ANTIBODIES: CPT | Performed by: FAMILY MEDICINE

## 2020-02-04 PROCEDURE — 85025 COMPLETE CBC W/AUTO DIFF WBC: CPT | Performed by: FAMILY MEDICINE

## 2020-02-04 PROCEDURE — 80053 COMPREHEN METABOLIC PANEL: CPT | Performed by: FAMILY MEDICINE

## 2020-02-04 PROCEDURE — 84443 ASSAY THYROID STIM HORMONE: CPT | Performed by: FAMILY MEDICINE

## 2020-02-04 PROCEDURE — 82550 ASSAY OF CK (CPK): CPT | Performed by: FAMILY MEDICINE

## 2020-02-04 PROCEDURE — 85652 RBC SED RATE AUTOMATED: CPT | Performed by: FAMILY MEDICINE

## 2020-02-04 PROCEDURE — 80061 LIPID PANEL: CPT | Performed by: FAMILY MEDICINE

## 2020-02-04 RX ORDER — FLUCONAZOLE 200 MG/1
200 TABLET ORAL
Qty: 4 TABLET | Refills: 0 | Status: SHIPPED | OUTPATIENT
Start: 2020-02-04 | End: 2020-03-04

## 2020-02-05 LAB
ANA SCREEN: NEGATIVE
SED RATE-ML: 10 MM/HR (ref 0–25)

## 2020-03-01 NOTE — PROGRESS NOTES
Elizabeth Wise is a 27year old female. HPI:   Patient here for eval of 2 different rashes:    1) recurrent red rash on cheeks, not really itchy    2) newer rash on upper eyelids and R elbow, red, mildly itchy. No exposure to anything. No pain.     3) shortness of breath with exertion  CARDIOVASCULAR: denies chest pain on exertion  GI: denies abdominal pain and denies heartburn  NEURO: denies headaches  MSUC: no new muscle or joint aches    EXAM:   /80   Pulse 70   Temp 98.5 °F (36.9 °C) (Temporal plan.  The patient is asked to return pending results and response to treatmnet.

## 2020-03-05 RX ORDER — BUPROPION HYDROCHLORIDE 300 MG/1
TABLET ORAL
Qty: 90 TABLET | Refills: 1 | Status: SHIPPED | OUTPATIENT
Start: 2020-03-05 | End: 2020-09-01

## 2020-03-09 RX ORDER — ESCITALOPRAM OXALATE 20 MG/1
TABLET ORAL
Qty: 90 TABLET | Refills: 1 | Status: SHIPPED | OUTPATIENT
Start: 2020-03-09 | End: 2020-09-09

## 2020-03-09 NOTE — TELEPHONE ENCOUNTER
Routing to provider per protocol. Last refilled on 8/28/19 for # 90 with 1 rf. Last seen on 2/4/20. No future appointments. Thank you.

## 2020-05-01 DIAGNOSIS — E03.8 HYPOTHYROIDISM DUE TO HASHIMOTO'S THYROIDITIS: ICD-10-CM

## 2020-05-01 DIAGNOSIS — E06.3 HYPOTHYROIDISM DUE TO HASHIMOTO'S THYROIDITIS: ICD-10-CM

## 2020-05-01 RX ORDER — ERGOCALCIFEROL 1.25 MG/1
CAPSULE ORAL
Qty: 6 CAPSULE | Refills: 1 | Status: SHIPPED | OUTPATIENT
Start: 2020-05-01 | End: 2020-10-28

## 2020-05-01 RX ORDER — LEVOTHYROXINE SODIUM 0.07 MG/1
TABLET ORAL
Qty: 90 TABLET | Refills: 1 | Status: SHIPPED | OUTPATIENT
Start: 2020-05-01 | End: 2020-10-28

## 2020-05-01 NOTE — TELEPHONE ENCOUNTER
ERGOCALCIFEROL 1.25 MG (60091 UT    Last refilled: 1/27/20 - 6 caps - 0 refills    Last OV: 2/4/20 - skin rash -     Last Vit D: 2/4/20 -  43.1      No future appts. Please advise.

## 2020-05-01 NOTE — TELEPHONE ENCOUNTER
Last OV 2/4/2020  Last lab 2/4/2020 TSH 3.620   Last refilled 11/4/19  #90  1 refill    Refilled x 6 mnths per protocol

## 2020-09-01 RX ORDER — BUPROPION HYDROCHLORIDE 300 MG/1
TABLET ORAL
Qty: 90 TABLET | Refills: 1 | Status: SHIPPED | OUTPATIENT
Start: 2020-09-01 | End: 2021-02-26

## 2020-09-09 RX ORDER — ESCITALOPRAM OXALATE 20 MG/1
TABLET ORAL
Qty: 90 TABLET | Refills: 1 | Status: SHIPPED | OUTPATIENT
Start: 2020-09-09 | End: 2021-03-05

## 2020-09-09 NOTE — TELEPHONE ENCOUNTER
Protocol: none  Last refilled 3/9/20 #90 with 1 RF  LOV with [address] 2/4/20  No future appt  Routed to PCP to advise

## 2020-10-28 DIAGNOSIS — E03.8 HYPOTHYROIDISM DUE TO HASHIMOTO'S THYROIDITIS: ICD-10-CM

## 2020-10-28 DIAGNOSIS — E06.3 HYPOTHYROIDISM DUE TO HASHIMOTO'S THYROIDITIS: ICD-10-CM

## 2020-10-28 RX ORDER — LEVOTHYROXINE SODIUM 0.07 MG/1
TABLET ORAL
Qty: 90 TABLET | Refills: 1 | Status: SHIPPED | OUTPATIENT
Start: 2020-10-28 | End: 2021-04-23

## 2020-10-28 RX ORDER — ERGOCALCIFEROL 1.25 MG/1
CAPSULE ORAL
Qty: 6 CAPSULE | Refills: 1 | Status: SHIPPED | OUTPATIENT
Start: 2020-10-28 | End: 2021-04-23

## 2020-10-28 NOTE — TELEPHONE ENCOUNTER
Both last refilled 5/1/20 with 1 refill each  LOV with [address] 2/4/20  No future appt  Vit D #6 caps  Levothyroxine #90  Vitamin D protocol: none  Routed to PCP to advise refill on the Vit D    Levothyroxine passed protocol due to the following reasons:  Last T

## 2020-11-05 ENCOUNTER — TELEPHONE (OUTPATIENT)
Dept: FAMILY MEDICINE CLINIC | Facility: CLINIC | Age: 35
End: 2020-11-05

## 2020-11-05 ENCOUNTER — TELEMEDICINE (OUTPATIENT)
Dept: FAMILY MEDICINE CLINIC | Facility: CLINIC | Age: 35
End: 2020-11-05

## 2020-11-05 DIAGNOSIS — R07.89 CHEST TIGHTNESS: ICD-10-CM

## 2020-11-05 DIAGNOSIS — R05.9 COUGH: Primary | ICD-10-CM

## 2020-11-05 DIAGNOSIS — J02.9 ST (SORE THROAT): ICD-10-CM

## 2020-11-05 DIAGNOSIS — R06.02 SOB (SHORTNESS OF BREATH): ICD-10-CM

## 2020-11-05 PROCEDURE — 99214 OFFICE O/P EST MOD 30 MIN: CPT | Performed by: FAMILY MEDICINE

## 2020-11-05 RX ORDER — PREDNISONE 20 MG/1
TABLET ORAL
Qty: 18 TABLET | Refills: 0 | Status: SHIPPED | OUTPATIENT
Start: 2020-11-05 | End: 2020-11-14

## 2020-11-05 RX ORDER — AZITHROMYCIN 250 MG/1
TABLET, FILM COATED ORAL
Qty: 6 TABLET | Refills: 0 | Status: SHIPPED | OUTPATIENT
Start: 2020-11-05 | End: 2020-11-10

## 2020-11-05 NOTE — TELEPHONE ENCOUNTER
Pt called, Thinks she is having covid symptoms, had a test done this past Monday-neg. Still has shortness of breath, cough, stuffy nose, no fever,headaches,fatigue.   Please call pt at [phone]

## 2020-11-05 NOTE — PROGRESS NOTES
My Chart/ Video/Telephone Visit Check-In Due to Mohamud Costa verbally consents a video Check-In service on 11/05/20.   Patient understands and accepts financial responsibility for any deductible, co-insurance and/or co-pays associat Family Status   Relation Status   • Fa Alive   • Mo Alive   • Mike (Not Specified)   • Son (Not Specified)   • MGMA         unknown   • MGFA         unknown   • PGMA Alive   • PGFA  at age 76s        \"natural causes\"   • Sis A No follow-ups on file. Authorized by Katie Coleman M.D. Please note that the following visit was completed using two-way, real-time interactive audio and/or video communication.   This has been done in good skyler to provide continuity

## 2020-11-05 NOTE — TELEPHONE ENCOUNTER
Patient advised of the information per . [facility] verbally consents to a Virtual/Telephone Check-In service on 11 5 2020.   Patient understands and accepts financial responsibility for any deductible, co-insurance and/or co-pays associa

## 2020-12-23 ENCOUNTER — TELEMEDICINE (OUTPATIENT)
Dept: FAMILY MEDICINE CLINIC | Facility: CLINIC | Age: 35
End: 2020-12-23

## 2020-12-23 DIAGNOSIS — U07.1 COVID-19: Primary | ICD-10-CM

## 2020-12-23 DIAGNOSIS — R05.9 COUGH: ICD-10-CM

## 2020-12-23 DIAGNOSIS — R53.83 FATIGUE, UNSPECIFIED TYPE: ICD-10-CM

## 2020-12-23 PROCEDURE — 99214 OFFICE O/P EST MOD 30 MIN: CPT | Performed by: FAMILY MEDICINE

## 2020-12-23 RX ORDER — BENZONATATE 200 MG/1
200 CAPSULE ORAL 3 TIMES DAILY PRN
Qty: 30 CAPSULE | Refills: 0 | Status: SHIPPED | OUTPATIENT
Start: 2020-12-23 | End: 2020-12-24

## 2020-12-23 NOTE — PROGRESS NOTES
Yael Nichole is a 38year old female.   HPI:   Virtual Video/Telephone Check-In    Yael Nichole verbally consents to a [employer]ximity video visit on 12/23/2020    Patient understands and accepts financial responsibility for any deductible, co-insurance (200 mg total) by mouth 3 (three) times daily as needed for cough.  30 capsule 0   • LEVOTHYROXINE SODIUM 75 MCG Oral Tab TAKE 1 TABLET(75 MCG) BY MOUTH EVERY MORNING BEFORE BREAKFAST 90 tablet 1   • ERGOCALCIFEROL 1.25 MG (44000 UT) Oral Cap TAKE 1 CAPSULE 01/28/2020   GENERAL: well developed, well nourished, looks a gbit tired  SKIN: no rashes,no suspicious lesions on face or ant neck  HEENT: atraumatic, normocephalic no active nasal congestion  EYES: non-injected, EOMI, no drainage  NECK: no JVD nor obviou

## 2020-12-24 ENCOUNTER — PATIENT MESSAGE (OUTPATIENT)
Dept: FAMILY MEDICINE CLINIC | Facility: CLINIC | Age: 35
End: 2020-12-24

## 2020-12-24 ENCOUNTER — TELEPHONE (OUTPATIENT)
Dept: FAMILY MEDICINE CLINIC | Facility: CLINIC | Age: 35
End: 2020-12-24

## 2020-12-24 RX ORDER — PROMETHAZINE HYDROCHLORIDE AND CODEINE PHOSPHATE 6.25; 1 MG/5ML; MG/5ML
5 SYRUP ORAL EVERY 6 HOURS PRN
Qty: 180 ML | Refills: 0 | Status: SHIPPED | OUTPATIENT
Start: 2020-12-24 | End: 2021-05-05

## 2020-12-24 NOTE — TELEPHONE ENCOUNTER
Took some claritin last night and it did nothing  Eyes and lips and Right thumb and right index finger are swollen    Breathing is fine otherwise    Cough is still there- some fatigue- some body aches-no weakness  Appetite- same as yesterday  Fever- she do

## 2020-12-24 NOTE — TELEPHONE ENCOUNTER
Called the pt and advised of the notes and she v/u she would like the phenergen with codeine  Advised to separate the cough syrup from the benadyl by at least 6 hours.     She v/u    Please send the cough syrup to [facility] 34/47

## 2020-12-24 NOTE — TELEPHONE ENCOUNTER
Message  Received:  Today  Message Contents   Kelly Rodriguez Nurse   Caller: Unspecified (Today, 10:57 AM)             Pt called back to discuss benadryl dosage      Called the pt and left message for the pt that she can take 50mg of benadryl

## 2020-12-24 NOTE — TELEPHONE ENCOUNTER
From: Shira Yu  To: Shoshana Varela MD  Sent: 12/24/2020 4:45 AM CST  Subject: Prescription Question    Hi Dr Jose Wetzel, I think I am allergic to the cough medicine. My lips have been swollen since about 7pm last night.  My index finger on right hand and

## 2020-12-24 NOTE — TELEPHONE ENCOUNTER
Sharmila Colon. Benadryl 50mg every 6 hrs until sewlling improving.   That will likely help with cough for now, once done with benadryl for rxn she can either keep that up prn for cough if it helped, of can do phenergan with codeine (can be sedating)

## 2020-12-24 NOTE — TELEPHONE ENCOUNTER
From: Jocelyn Jones  To: Bhargavi Schmitz MD  Sent: 12/24/2020 7:32 AM CST  Subject: Prescription Question    Hello - me again. My lips are still swollen but now my eyes are swelling too. Just wanted to let you know the latest development. Thanks.

## 2020-12-24 NOTE — TELEPHONE ENCOUNTER
----- Message from Micaela Valentin RN sent at 12/23/2020  9:27 AM CST -----  Call for Condition Update Covid + with persistent cough

## 2020-12-28 ENCOUNTER — TELEPHONE (OUTPATIENT)
Dept: FAMILY MEDICINE CLINIC | Facility: CLINIC | Age: 35
End: 2020-12-28

## 2020-12-28 NOTE — TELEPHONE ENCOUNTER
Message  Received:  Today  Message Contents   Hampel, Wilder Cogan Nurse   Caller: Unspecified (Today, 12:41 PM)             Returned your call     Spoke with the pt and she states that her appetite is better  Cough is better  Pt states that overa

## 2020-12-30 ENCOUNTER — TELEPHONE (OUTPATIENT)
Dept: FAMILY MEDICINE CLINIC | Facility: CLINIC | Age: 35
End: 2020-12-30

## 2020-12-30 NOTE — TELEPHONE ENCOUNTER
----- Message from Amalia Gallegos RN sent at 12/28/2020  2:43 PM CST -----  Call pt for condition update please

## 2020-12-30 NOTE — TELEPHONE ENCOUNTER
Spoke with the pt and she is doing better, the cough is better and Appetite is better  Had a HA yesterday but she drank some coffee and it subsided.     Advised to call back if questions or concerns or worsening of symptoms

## 2021-02-25 NOTE — TELEPHONE ENCOUNTER
Protocol: none  Last refilled 9/1/20 #90 with 1 RF  Last visit- DOX with [facility] 12/23/20  No future appt  Routed to PCP to advise

## 2021-02-26 RX ORDER — BUPROPION HYDROCHLORIDE 300 MG/1
TABLET ORAL
Qty: 90 TABLET | Refills: 1 | Status: SHIPPED | OUTPATIENT
Start: 2021-02-26 | End: 2021-08-31

## 2021-03-05 RX ORDER — ESCITALOPRAM OXALATE 20 MG/1
TABLET ORAL
Qty: 90 TABLET | Refills: 1 | Status: SHIPPED | OUTPATIENT
Start: 2021-03-05 | End: 2021-04-23

## 2021-03-15 ENCOUNTER — PATIENT MESSAGE (OUTPATIENT)
Dept: FAMILY MEDICINE CLINIC | Facility: CLINIC | Age: 36
End: 2021-03-15

## 2021-03-15 DIAGNOSIS — G44.89 OTHER HEADACHE SYNDROME: Primary | ICD-10-CM

## 2021-03-16 NOTE — TELEPHONE ENCOUNTER
From: [employer]  To:  Antonella Oglesby MD  Sent: 3/15/2021 9:58 PM CDT  Subject: Non-Urgent Medical Question    Hi Dr Cosmo Fields  So it looks like I am having the same issue with my right eyelid that I had last year - a super dry patch on my eyelid that itch

## 2021-03-16 NOTE — TELEPHONE ENCOUNTER
From: Rianna Cortes  To: Erica Giordano MD  Sent: 3/15/2021 10:15 PM CDT  Subject: Non-Urgent Medical Question    Hello again   I know it has been a while since we talked about these tests or my headache/ migraine issues.  But I think it's time I see a n

## 2021-04-23 DIAGNOSIS — E03.8 HYPOTHYROIDISM DUE TO HASHIMOTO'S THYROIDITIS: ICD-10-CM

## 2021-04-23 DIAGNOSIS — E06.3 HYPOTHYROIDISM DUE TO HASHIMOTO'S THYROIDITIS: ICD-10-CM

## 2021-04-23 RX ORDER — ERGOCALCIFEROL 1.25 MG/1
50000 CAPSULE ORAL
Qty: 6 CAPSULE | Refills: 1 | Status: SHIPPED | OUTPATIENT
Start: 2021-04-23

## 2021-04-23 RX ORDER — LEVOTHYROXINE SODIUM 0.07 MG/1
75 TABLET ORAL
Qty: 90 TABLET | Refills: 0 | Status: SHIPPED | OUTPATIENT
Start: 2021-04-23 | End: 2021-07-22

## 2021-04-23 RX ORDER — ESCITALOPRAM OXALATE 20 MG/1
20 TABLET ORAL DAILY
Qty: 90 TABLET | Refills: 1 | Status: SHIPPED | OUTPATIENT
Start: 2021-04-23 | End: 2021-08-30

## 2021-04-23 NOTE — TELEPHONE ENCOUNTER
Can you please verify what med patient requesting--escitalopram (lexapro) is pended, but free text note says Vitamin D2

## 2021-04-23 NOTE — TELEPHONE ENCOUNTER
Thyroid Supplements Protocol [id number]/23/2021 12:52 PM   TSH test in past 12 months Protocol Details    TSH value between 0.350 and 5.500 IU/ml     Appointment in past 12 or next 3 months        Last refilled on 10/28/2020 for # 90 with 1 rf.    Last labs 0

## 2021-05-05 ENCOUNTER — OFFICE VISIT (OUTPATIENT)
Dept: FAMILY MEDICINE CLINIC | Facility: CLINIC | Age: 36
End: 2021-05-05

## 2021-05-05 VITALS
BODY MASS INDEX: 33.64 KG/M2 | WEIGHT: 182.81 LBS | SYSTOLIC BLOOD PRESSURE: 110 MMHG | DIASTOLIC BLOOD PRESSURE: 80 MMHG | HEART RATE: 84 BPM | TEMPERATURE: 98 F | OXYGEN SATURATION: 99 % | HEIGHT: 62 IN

## 2021-05-05 DIAGNOSIS — F43.21 ADJUSTMENT DISORDER WITH DEPRESSED MOOD: ICD-10-CM

## 2021-05-05 DIAGNOSIS — Z13.220 LIPID SCREENING: ICD-10-CM

## 2021-05-05 DIAGNOSIS — G43.711 INTRACTABLE CHRONIC MIGRAINE WITHOUT AURA AND WITH STATUS MIGRAINOSUS: Primary | ICD-10-CM

## 2021-05-05 DIAGNOSIS — R73.9 ELEVATED BLOOD SUGAR: ICD-10-CM

## 2021-05-05 DIAGNOSIS — G44.209 TENSION HEADACHE: ICD-10-CM

## 2021-05-05 DIAGNOSIS — E55.9 VITAMIN D DEFICIENCY: ICD-10-CM

## 2021-05-05 DIAGNOSIS — E03.8 HYPOTHYROIDISM DUE TO HASHIMOTO'S THYROIDITIS: ICD-10-CM

## 2021-05-05 DIAGNOSIS — E06.3 HYPOTHYROIDISM DUE TO HASHIMOTO'S THYROIDITIS: ICD-10-CM

## 2021-05-05 PROCEDURE — 84443 ASSAY THYROID STIM HORMONE: CPT | Performed by: FAMILY MEDICINE

## 2021-05-05 PROCEDURE — 80053 COMPREHEN METABOLIC PANEL: CPT | Performed by: FAMILY MEDICINE

## 2021-05-05 PROCEDURE — 3008F BODY MASS INDEX DOCD: CPT | Performed by: FAMILY MEDICINE

## 2021-05-05 PROCEDURE — 85025 COMPLETE CBC W/AUTO DIFF WBC: CPT | Performed by: FAMILY MEDICINE

## 2021-05-05 PROCEDURE — 3079F DIAST BP 80-89 MM HG: CPT | Performed by: FAMILY MEDICINE

## 2021-05-05 PROCEDURE — 99214 OFFICE O/P EST MOD 30 MIN: CPT | Performed by: FAMILY MEDICINE

## 2021-05-05 PROCEDURE — 3074F SYST BP LT 130 MM HG: CPT | Performed by: FAMILY MEDICINE

## 2021-05-05 PROCEDURE — 80061 LIPID PANEL: CPT | Performed by: FAMILY MEDICINE

## 2021-05-05 PROCEDURE — 83036 HEMOGLOBIN GLYCOSYLATED A1C: CPT | Performed by: FAMILY MEDICINE

## 2021-05-05 PROCEDURE — 82306 VITAMIN D 25 HYDROXY: CPT | Performed by: FAMILY MEDICINE

## 2021-05-05 NOTE — PROGRESS NOTES
Javier Metzger is a 38year old female. HPI:   Patient here to f/u on chronic conditions. NO major changes in her health.   She has been using CBD to see if it could help with depressive symptoms, sleep and thinks it's helping--can fall asleep and st Disorder Paternal Grandmother         stroke      Social History    Tobacco Use      Smoking status: Never Smoker      Smokeless tobacco: Never Used    Vaping Use      Vaping Use: Never used    Alcohol use:  Yes      Alcohol/week: 0.0 standard drinks      C 25-HYDROXY; Future    Hypothyroidism due to Hashimoto's thyroiditis  --assess control today   -     VENIPUNCTURE  -     CBC WITH DIFFERENTIAL WITH PLATELET; Future  -     COMP METABOLIC PANEL (14);  Future  -     HEMOGLOBIN A1C; Future  -     LIPID PANEL; F

## 2021-05-23 PROBLEM — E06.3 HYPOTHYROIDISM DUE TO HASHIMOTO'S THYROIDITIS: Status: ACTIVE | Noted: 2021-05-23

## 2021-05-23 PROBLEM — E03.8 SUBCLINICAL HYPOTHYROIDISM: Status: RESOLVED | Noted: 2017-06-20 | Resolved: 2021-05-23

## 2021-05-23 PROBLEM — E03.8 HYPOTHYROIDISM DUE TO HASHIMOTO'S THYROIDITIS: Status: ACTIVE | Noted: 2021-05-23

## 2021-07-22 DIAGNOSIS — E06.3 HYPOTHYROIDISM DUE TO HASHIMOTO'S THYROIDITIS: ICD-10-CM

## 2021-07-22 DIAGNOSIS — E03.8 HYPOTHYROIDISM DUE TO HASHIMOTO'S THYROIDITIS: ICD-10-CM

## 2021-07-22 RX ORDER — LEVOTHYROXINE SODIUM 0.07 MG/1
TABLET ORAL
Qty: 90 TABLET | Refills: 0 | Status: SHIPPED | OUTPATIENT
Start: 2021-07-22 | End: 2021-10-18

## 2021-08-30 ENCOUNTER — OFFICE VISIT (OUTPATIENT)
Dept: FAMILY MEDICINE CLINIC | Facility: CLINIC | Age: 36
End: 2021-08-30

## 2021-08-30 VITALS
DIASTOLIC BLOOD PRESSURE: 72 MMHG | OXYGEN SATURATION: 99 % | WEIGHT: 188.81 LBS | BODY MASS INDEX: 34.74 KG/M2 | HEART RATE: 84 BPM | HEIGHT: 62 IN | SYSTOLIC BLOOD PRESSURE: 130 MMHG | TEMPERATURE: 98 F

## 2021-08-30 DIAGNOSIS — F32.A DEPRESSION, UNSPECIFIED DEPRESSION TYPE: ICD-10-CM

## 2021-08-30 DIAGNOSIS — N89.8 VAGINAL LESION: Primary | ICD-10-CM

## 2021-08-30 PROCEDURE — 3075F SYST BP GE 130 - 139MM HG: CPT | Performed by: FAMILY MEDICINE

## 2021-08-30 PROCEDURE — 3078F DIAST BP <80 MM HG: CPT | Performed by: FAMILY MEDICINE

## 2021-08-30 PROCEDURE — 99214 OFFICE O/P EST MOD 30 MIN: CPT | Performed by: FAMILY MEDICINE

## 2021-08-30 PROCEDURE — 3008F BODY MASS INDEX DOCD: CPT | Performed by: FAMILY MEDICINE

## 2021-08-30 RX ORDER — ESCITALOPRAM OXALATE 20 MG/1
10 TABLET ORAL DAILY
COMMUNITY
Start: 2021-08-30 | End: 2021-09-06 | Stop reason: ALTCHOICE

## 2021-08-30 RX ORDER — FLUOXETINE HYDROCHLORIDE 20 MG/1
20 CAPSULE ORAL DAILY
Qty: 90 CAPSULE | Refills: 3 | Status: SHIPPED | OUTPATIENT
Start: 2021-08-30

## 2021-08-30 NOTE — PROGRESS NOTES
Marven Lennox is a 38year old female. HPI:   A skin lesion popped up on her vagina 5 days ago, was a tender bubble, it popped a few days later, but has never had anything like this so wanted it looked at.   No obviosu trauma but it did appear a week a Hypertension Mother    • Lipids Mother    • Heart Disorder Paternal Grandmother         stroke      Social History    Tobacco Use      Smoking status: Never Smoker      Smokeless tobacco: Never Used    Vaping Use      Vaping Use: Never used    Alcohol use:

## 2021-08-31 RX ORDER — BUPROPION HYDROCHLORIDE 300 MG/1
TABLET ORAL
Qty: 90 TABLET | Refills: 1 | Status: SHIPPED | OUTPATIENT
Start: 2021-08-31

## 2021-08-31 NOTE — TELEPHONE ENCOUNTER
Last refilled 2/26/21 for #90 with 1 RF  LOV with [facility] 8/30/21  Future appt with MM 9/27/21  Routed to PCP to advise

## 2021-09-01 DIAGNOSIS — F32.A DEPRESSION, UNSPECIFIED DEPRESSION TYPE: Primary | ICD-10-CM

## 2021-09-01 RX ORDER — ESCITALOPRAM OXALATE 20 MG/1
TABLET ORAL
Qty: 90 TABLET | Refills: 1 | OUTPATIENT
Start: 2021-09-01

## 2021-10-18 DIAGNOSIS — E03.8 HYPOTHYROIDISM DUE TO HASHIMOTO'S THYROIDITIS: ICD-10-CM

## 2021-10-18 DIAGNOSIS — E06.3 HYPOTHYROIDISM DUE TO HASHIMOTO'S THYROIDITIS: ICD-10-CM

## 2021-10-18 RX ORDER — ERGOCALCIFEROL 1.25 MG/1
50000 CAPSULE ORAL
Qty: 6 CAPSULE | Refills: 1 | OUTPATIENT
Start: 2021-10-18

## 2021-10-18 RX ORDER — LEVOTHYROXINE SODIUM 0.07 MG/1
75 TABLET ORAL
Qty: 90 TABLET | Refills: 0 | Status: SHIPPED | OUTPATIENT
Start: 2021-10-18 | End: 2022-01-17

## 2021-10-18 NOTE — TELEPHONE ENCOUNTER
Routing to provider per protocol.   ergocalciferol 1.25 MG (42182 UT) Oral Cap    Last refilled on 4/23/21 for #6  with 1 rf. Last labs 5/5/21. Last seen on 8/30/21 w/MK. No future appointments. Thank you.

## 2021-10-18 NOTE — TELEPHONE ENCOUNTER
Thyroid Supplements Protocol Passed 10/18/2021 01:24 PM   Protocol Details  TSH test in past 12 months    TSH value between 0.350 and 5.500 IU/ml    Appointment in past 12 or next 3 months        LEVOTHYROXINE SODIUM 75 MCG Oral Tab  Last refilled on 7/22

## 2021-10-18 NOTE — TELEPHONE ENCOUNTER
Is this a random refill request ? Last Vitamin D level was normal back in May. Why does she need a refill of this high dose Vitamin D ? She just needs to take daily Vitamin D 2000 international units per day and do so on a daily basis.

## 2021-11-18 ENCOUNTER — IMMUNIZATION (OUTPATIENT)
Dept: FAMILY MEDICINE CLINIC | Facility: CLINIC | Age: 36
End: 2021-11-18

## 2021-11-18 DIAGNOSIS — Z23 NEED FOR VACCINATION: Primary | ICD-10-CM

## 2021-11-18 PROCEDURE — 90686 IIV4 VACC NO PRSV 0.5 ML IM: CPT | Performed by: FAMILY MEDICINE

## 2021-11-18 PROCEDURE — 90471 IMMUNIZATION ADMIN: CPT | Performed by: FAMILY MEDICINE

## 2021-11-27 ENCOUNTER — OFFICE VISIT (OUTPATIENT)
Dept: FAMILY MEDICINE CLINIC | Facility: CLINIC | Age: 36
End: 2021-11-27

## 2021-11-27 VITALS
WEIGHT: 186.38 LBS | BODY MASS INDEX: 34 KG/M2 | DIASTOLIC BLOOD PRESSURE: 80 MMHG | SYSTOLIC BLOOD PRESSURE: 110 MMHG | OXYGEN SATURATION: 99 % | TEMPERATURE: 97 F | HEART RATE: 94 BPM

## 2021-11-27 DIAGNOSIS — Z09 FOLLOW UP: ICD-10-CM

## 2021-11-27 DIAGNOSIS — E78.5 HYPERLIPIDEMIA, UNSPECIFIED HYPERLIPIDEMIA TYPE: ICD-10-CM

## 2021-11-27 DIAGNOSIS — R73.03 BORDERLINE DIABETES: ICD-10-CM

## 2021-11-27 DIAGNOSIS — E55.9 VITAMIN D DEFICIENCY: Primary | ICD-10-CM

## 2021-11-27 DIAGNOSIS — Z51.81 MEDICATION MONITORING ENCOUNTER: ICD-10-CM

## 2021-11-27 DIAGNOSIS — F32.A DEPRESSION, UNSPECIFIED DEPRESSION TYPE: ICD-10-CM

## 2021-11-27 PROCEDURE — 3074F SYST BP LT 130 MM HG: CPT | Performed by: FAMILY MEDICINE

## 2021-11-27 PROCEDURE — 99214 OFFICE O/P EST MOD 30 MIN: CPT | Performed by: FAMILY MEDICINE

## 2021-11-27 PROCEDURE — 3079F DIAST BP 80-89 MM HG: CPT | Performed by: FAMILY MEDICINE

## 2021-11-27 NOTE — PROGRESS NOTES
[facility] Office Note  Chief Complaint:   Patient presents with:  Medication Follow-Up      HPI:   This is a 39year old female coming in for follow up   Wellbutrin in the PM  Prozac in the AM   Depression - evened out now and fe Migraine. Use at onset; repeat once after 2 HRS-ONLY 4 IN 24 HR MAX 9 tablet 1   • ergocalciferol 1.25 MG (87949 UT) Oral Cap Take 1 capsule (50,000 Units total) by mouth every 14 (fourteen) days.  (Patient not taking: Reported on 11/27/2021) 6 capsule 1 medication Prozac 20 mg and doing well with this medication. Patient verbalized understanding and agreed with the plan. Patient was given the opportunity to ask questions. All questions were addressed, and patient voices no further concerns.  Patient is

## 2022-01-17 DIAGNOSIS — E03.8 HYPOTHYROIDISM DUE TO HASHIMOTO'S THYROIDITIS: ICD-10-CM

## 2022-01-17 DIAGNOSIS — E06.3 HYPOTHYROIDISM DUE TO HASHIMOTO'S THYROIDITIS: ICD-10-CM

## 2022-01-17 RX ORDER — LEVOTHYROXINE SODIUM 0.07 MG/1
TABLET ORAL
Qty: 90 TABLET | Refills: 1 | Status: SHIPPED | OUTPATIENT
Start: 2022-01-17

## 2022-02-28 RX ORDER — BUPROPION HYDROCHLORIDE 300 MG/1
300 TABLET ORAL DAILY
Qty: 90 TABLET | Refills: 1 | Status: SHIPPED | OUTPATIENT
Start: 2022-02-28

## 2022-03-02 ENCOUNTER — TELEPHONE (OUTPATIENT)
Dept: FAMILY MEDICINE CLINIC | Facility: CLINIC | Age: 37
End: 2022-03-02

## 2022-03-02 NOTE — TELEPHONE ENCOUNTER
Pt c/o Stomach bug vs food poisoning? ?    Sunday into Monday - vomited after dinner -  Had chicken pasta at restaurant - pt had before with no issues    Pt reports chills and sweats prior to vomiting and diarrhea  Last time - Diarrhea and vomiting - Monday    Has not been eating much since, states she is \"Still not hungry\" - making herself eat - Drinking Gatorade and water    Yesterday - had bread and egg - made stomach uneasy  Today - had tea - made stomach uneasy    Stomach pain went away after going to bathroom (diarrhea and vomiting)    Pt main concern is stomach feeling uneasy, nausea - looking for recommendations    If sending Rx - please send to [address]    Pt agreeable to receive [facility] as response    Please advise, thank you

## 2022-03-02 NOTE — TELEPHONE ENCOUNTER
Pt called states that Sunday night she started with a stomach bug and still does not feel okay pt would like to know if after so many days is normal to still feel like that has not been able to eat       Pt call back # [phone]    Thank you

## 2022-03-03 RX ORDER — ONDANSETRON 4 MG/1
4 TABLET, FILM COATED ORAL EVERY 8 HOURS PRN
Qty: 15 TABLET | Refills: 0 | Status: SHIPPED | OUTPATIENT
Start: 2022-03-03 | End: 2022-03-08

## 2022-03-03 NOTE — TELEPHONE ENCOUNTER
Please provide patient with the following recommendations and send Rx for Zofran. Thank you. Rx Zofran 4 mg every 8 hours as needed for nausea or vomiting (please send this to the pharmacy for her)   Adequate hydration with Pedialyte, free water or popsicles advised   Continue to monitor input and output   Urine should be a light yellow color   BRAT diet emphasized - Bananas, Rice, Applesauce and Toast for the next 48 hours and then advance diet slowly   OTC Probiotic Culturelle OR Florastor strongly recommended   If symptoms continue or concern for dehydration go to the IC.

## 2022-04-27 RX ORDER — LEVOTHYROXINE SODIUM 0.07 MG/1
TABLET ORAL
Qty: 90 TABLET | Refills: 1 | OUTPATIENT
Start: 2022-04-27

## 2022-05-01 ENCOUNTER — OFFICE VISIT (OUTPATIENT)
Dept: FAMILY MEDICINE CLINIC | Facility: CLINIC | Age: 37
End: 2022-05-01
Payer: COMMERCIAL

## 2022-05-01 VITALS
BODY MASS INDEX: 33.13 KG/M2 | RESPIRATION RATE: 20 BRPM | HEIGHT: 62 IN | HEART RATE: 94 BPM | WEIGHT: 180 LBS | DIASTOLIC BLOOD PRESSURE: 70 MMHG | SYSTOLIC BLOOD PRESSURE: 100 MMHG | TEMPERATURE: 98 F | OXYGEN SATURATION: 98 %

## 2022-05-01 DIAGNOSIS — J03.90 TONSILLITIS: Primary | ICD-10-CM

## 2022-05-01 PROBLEM — J02.0 STREP PHARYNGITIS: Status: ACTIVE | Noted: 2022-05-01

## 2022-05-01 LAB
CONTROL LINE PRESENT WITH A CLEAR BACKGROUND (YES/NO): YES YES/NO
KIT EXPIRATION DATE: NORMAL DATE

## 2022-05-01 PROCEDURE — 3074F SYST BP LT 130 MM HG: CPT | Performed by: NURSE PRACTITIONER

## 2022-05-01 PROCEDURE — 3078F DIAST BP <80 MM HG: CPT | Performed by: NURSE PRACTITIONER

## 2022-05-01 PROCEDURE — 99213 OFFICE O/P EST LOW 20 MIN: CPT | Performed by: NURSE PRACTITIONER

## 2022-05-01 PROCEDURE — 3008F BODY MASS INDEX DOCD: CPT | Performed by: NURSE PRACTITIONER

## 2022-05-01 PROCEDURE — 87880 STREP A ASSAY W/OPTIC: CPT | Performed by: NURSE PRACTITIONER

## 2022-05-01 RX ORDER — AMOXICILLIN 875 MG/1
875 TABLET, COATED ORAL 2 TIMES DAILY
Qty: 20 TABLET | Refills: 0 | Status: SHIPPED | OUTPATIENT
Start: 2022-05-01 | End: 2022-05-11

## 2022-05-26 RX ORDER — FLUOXETINE HYDROCHLORIDE 20 MG/1
20 CAPSULE ORAL DAILY
Qty: 90 CAPSULE | Refills: 3 | Status: SHIPPED | OUTPATIENT
Start: 2022-05-26

## 2022-06-04 NOTE — TELEPHONE ENCOUNTER
LOV 11/27/2021    Last refill on 02/28/2022, for #90 tabs, with 1 refills  buPROPion  MG Oral Tablet 24 Hr    No future appointments. Order(s) pending, please review. Thank you.

## 2022-06-05 RX ORDER — BUPROPION HYDROCHLORIDE 300 MG/1
TABLET ORAL
Qty: 90 TABLET | Refills: 1 | Status: SHIPPED | OUTPATIENT
Start: 2022-06-05

## 2022-07-25 DIAGNOSIS — E03.8 HYPOTHYROIDISM DUE TO HASHIMOTO'S THYROIDITIS: ICD-10-CM

## 2022-07-25 DIAGNOSIS — E06.3 HYPOTHYROIDISM DUE TO HASHIMOTO'S THYROIDITIS: ICD-10-CM

## 2022-07-25 NOTE — TELEPHONE ENCOUNTER
Thyroid Supplements Protocol Failed 07/25/2022 04:08 AM   Protocol Details  TSH test in past 12 months    TSH value between 0.350 and 5.500 IU/ml    Appointment in past 12 or next 3 months        Last OV 11/27/21  Last lab 5-5-2021 TSH 3.650  Last refilled 1/17/22  #90  1 refill Lab Facility: [id number] Lab Facility: [id number]

## 2022-07-26 RX ORDER — LEVOTHYROXINE SODIUM 0.07 MG/1
75 TABLET ORAL
Qty: 7 TABLET | Refills: 0 | Status: SHIPPED | OUTPATIENT
Start: 2022-07-26

## 2022-07-26 NOTE — TELEPHONE ENCOUNTER
Spoke to pt, VV made for next week 8/2/22 will do labs later during the week after MM places orders. 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 No further questions.

## 2022-07-26 NOTE — TELEPHONE ENCOUNTER
Due for labs and follow up. Please schedule a follow up visit to recheck labs and provide refill until that day. Thank you.

## 2022-08-02 ENCOUNTER — TELEMEDICINE (OUTPATIENT)
Dept: FAMILY MEDICINE CLINIC | Facility: CLINIC | Age: 37
End: 2022-08-02

## 2022-08-02 DIAGNOSIS — Z13.0 SCREENING, ANEMIA, DEFICIENCY, IRON: ICD-10-CM

## 2022-08-02 DIAGNOSIS — R73.03 PREDIABETES: ICD-10-CM

## 2022-08-02 DIAGNOSIS — E78.5 HYPERLIPIDEMIA, UNSPECIFIED HYPERLIPIDEMIA TYPE: ICD-10-CM

## 2022-08-02 DIAGNOSIS — E03.8 HYPOTHYROIDISM DUE TO HASHIMOTO'S THYROIDITIS: Primary | ICD-10-CM

## 2022-08-02 DIAGNOSIS — Z56.6 WORK-RELATED STRESS: ICD-10-CM

## 2022-08-02 DIAGNOSIS — E06.3 HYPOTHYROIDISM DUE TO HASHIMOTO'S THYROIDITIS: Primary | ICD-10-CM

## 2022-08-02 PROBLEM — J02.0 STREP PHARYNGITIS: Status: RESOLVED | Noted: 2022-05-01 | Resolved: 2022-08-02

## 2022-08-02 PROBLEM — F43.23 ADJUSTMENT REACTION WITH ANXIETY AND DEPRESSION: Status: ACTIVE | Noted: 2022-08-02

## 2022-08-02 PROCEDURE — 99213 OFFICE O/P EST LOW 20 MIN: CPT | Performed by: FAMILY MEDICINE

## 2022-08-02 PROCEDURE — 96127 BRIEF EMOTIONAL/BEHAV ASSMT: CPT | Performed by: FAMILY MEDICINE

## 2022-08-02 RX ORDER — BUPROPION HYDROCHLORIDE 300 MG/1
300 TABLET ORAL DAILY
Qty: 90 TABLET | Refills: 1 | Status: SHIPPED | OUTPATIENT
Start: 2022-08-02 | End: 2023-01-29

## 2022-08-02 RX ORDER — FLUOXETINE HYDROCHLORIDE 20 MG/1
40 CAPSULE ORAL DAILY
Qty: 180 CAPSULE | Refills: 1 | Status: SHIPPED | OUTPATIENT
Start: 2022-08-02 | End: 2023-01-29

## 2022-08-02 RX ORDER — LEVOTHYROXINE SODIUM 0.07 MG/1
75 TABLET ORAL
Qty: 90 TABLET | Refills: 1 | Status: SHIPPED | OUTPATIENT
Start: 2022-08-02 | End: 2023-01-29

## 2022-08-02 SDOH — HEALTH STABILITY - MENTAL HEALTH: OTHER PHYSICAL AND MENTAL STRAIN RELATED TO WORK: Z56.6

## 2022-08-05 ENCOUNTER — NURSE ONLY (OUTPATIENT)
Dept: FAMILY MEDICINE CLINIC | Facility: CLINIC | Age: 37
End: 2022-08-05
Payer: COMMERCIAL

## 2022-08-05 DIAGNOSIS — R73.03 PREDIABETES: ICD-10-CM

## 2022-08-05 DIAGNOSIS — E06.3 HYPOTHYROIDISM DUE TO HASHIMOTO'S THYROIDITIS: ICD-10-CM

## 2022-08-05 DIAGNOSIS — E78.5 HYPERLIPIDEMIA, UNSPECIFIED HYPERLIPIDEMIA TYPE: ICD-10-CM

## 2022-08-05 DIAGNOSIS — Z13.0 SCREENING, ANEMIA, DEFICIENCY, IRON: ICD-10-CM

## 2022-08-05 DIAGNOSIS — E03.8 HYPOTHYROIDISM DUE TO HASHIMOTO'S THYROIDITIS: ICD-10-CM

## 2022-08-05 LAB
ALBUMIN SERPL-MCNC: 3.8 G/DL (ref 3.4–5)
ALBUMIN/GLOB SERPL: 1.1 {RATIO} (ref 1–2)
ALP LIVER SERPL-CCNC: 57 U/L
ALT SERPL-CCNC: 24 U/L
ANION GAP SERPL CALC-SCNC: 4 MMOL/L (ref 0–18)
AST SERPL-CCNC: 11 U/L (ref 15–37)
BASOPHILS # BLD AUTO: 0.03 X10(3) UL (ref 0–0.2)
BASOPHILS NFR BLD AUTO: 0.6 %
BILIRUB SERPL-MCNC: 0.4 MG/DL (ref 0.1–2)
BUN BLD-MCNC: 11 MG/DL (ref 7–18)
CALCIUM BLD-MCNC: 8.6 MG/DL (ref 8.5–10.1)
CHLORIDE SERPL-SCNC: 110 MMOL/L (ref 98–112)
CHOLEST SERPL-MCNC: 184 MG/DL (ref ?–200)
CO2 SERPL-SCNC: 24 MMOL/L (ref 21–32)
CREAT BLD-MCNC: 0.9 MG/DL
EOSINOPHIL # BLD AUTO: 0.07 X10(3) UL (ref 0–0.7)
EOSINOPHIL NFR BLD AUTO: 1.3 %
ERYTHROCYTE [DISTWIDTH] IN BLOOD BY AUTOMATED COUNT: 14 %
EST. AVERAGE GLUCOSE BLD GHB EST-MCNC: 123 MG/DL (ref 68–126)
FASTING PATIENT LIPID ANSWER: YES
FASTING STATUS PATIENT QL REPORTED: YES
GFR SERPLBLD BASED ON 1.73 SQ M-ARVRAT: 84 ML/MIN/1.73M2 (ref 60–?)
GLOBULIN PLAS-MCNC: 3.4 G/DL (ref 2.8–4.4)
GLUCOSE BLD-MCNC: 103 MG/DL (ref 70–99)
HBA1C MFR BLD: 5.9 % (ref ?–5.7)
HCT VFR BLD AUTO: 39.5 %
HDLC SERPL-MCNC: 66 MG/DL (ref 40–59)
HGB BLD-MCNC: 12.4 G/DL
IMM GRANULOCYTES # BLD AUTO: 0.02 X10(3) UL (ref 0–1)
IMM GRANULOCYTES NFR BLD: 0.4 %
LDLC SERPL CALC-MCNC: 104 MG/DL (ref ?–100)
LYMPHOCYTES # BLD AUTO: 1.4 X10(3) UL (ref 1–4)
LYMPHOCYTES NFR BLD AUTO: 25.8 %
MCH RBC QN AUTO: 26.8 PG (ref 26–34)
MCHC RBC AUTO-ENTMCNC: 31.4 G/DL (ref 31–37)
MCV RBC AUTO: 85.5 FL
MONOCYTES # BLD AUTO: 0.59 X10(3) UL (ref 0.1–1)
MONOCYTES NFR BLD AUTO: 10.9 %
NEUTROPHILS # BLD AUTO: 3.32 X10 (3) UL (ref 1.5–7.7)
NEUTROPHILS # BLD AUTO: 3.32 X10(3) UL (ref 1.5–7.7)
NEUTROPHILS NFR BLD AUTO: 61 %
NONHDLC SERPL-MCNC: 118 MG/DL (ref ?–130)
OSMOLALITY SERPL CALC.SUM OF ELEC: 286 MOSM/KG (ref 275–295)
PLATELET # BLD AUTO: 350 10(3)UL (ref 150–450)
POTASSIUM SERPL-SCNC: 4 MMOL/L (ref 3.5–5.1)
PROT SERPL-MCNC: 7.2 G/DL (ref 6.4–8.2)
RBC # BLD AUTO: 4.62 X10(6)UL
SODIUM SERPL-SCNC: 138 MMOL/L (ref 136–145)
TRIGL SERPL-MCNC: 76 MG/DL (ref 30–149)
TSI SER-ACNC: 2.66 MIU/ML (ref 0.36–3.74)
VLDLC SERPL CALC-MCNC: 13 MG/DL (ref 0–30)
WBC # BLD AUTO: 5.4 X10(3) UL (ref 4–11)

## 2022-08-05 PROCEDURE — 80053 COMPREHEN METABOLIC PANEL: CPT | Performed by: FAMILY MEDICINE

## 2022-08-05 PROCEDURE — 83036 HEMOGLOBIN GLYCOSYLATED A1C: CPT | Performed by: FAMILY MEDICINE

## 2022-08-05 PROCEDURE — 84443 ASSAY THYROID STIM HORMONE: CPT | Performed by: FAMILY MEDICINE

## 2022-08-05 PROCEDURE — 80061 LIPID PANEL: CPT | Performed by: FAMILY MEDICINE

## 2022-08-05 PROCEDURE — 85025 COMPLETE CBC W/AUTO DIFF WBC: CPT | Performed by: FAMILY MEDICINE

## 2022-08-05 NOTE — PROGRESS NOTES
Ladan Hernandez present in office for nurse visit. Labs as ordered by Dr. Tim Rose; 24 g, Right AC, lavender tube and green tube     All questions/concerns addressed. Patient left in stable condition.

## 2022-08-10 ENCOUNTER — TELEPHONE (OUTPATIENT)
Dept: FAMILY MEDICINE CLINIC | Facility: CLINIC | Age: 37
End: 2022-08-10

## 2022-08-10 NOTE — TELEPHONE ENCOUNTER
----- Message from Crystal Aguirre MD sent at 8/5/2022  4:33 PM CDT -----  All labs look good. Cholesterol looks so improved.

## 2023-01-26 DIAGNOSIS — E06.3 HYPOTHYROIDISM DUE TO HASHIMOTO'S THYROIDITIS: ICD-10-CM

## 2023-01-26 DIAGNOSIS — E03.8 HYPOTHYROIDISM DUE TO HASHIMOTO'S THYROIDITIS: ICD-10-CM

## 2023-01-26 NOTE — TELEPHONE ENCOUNTER
LOV/Telemedicine 08/02/22  Last labs 08/05/22  Last refill on 08/02/22, for #90 tabs, with 1 refills  levothyroxine 75 MCG Oral Tab    Last refill on 08/02/22, for #180 caps, with 1 refills  FLUoxetine 20 MG Oral Cap    No future appointments. Order(s) pending, please review. Thank you.

## 2023-01-27 RX ORDER — LEVOTHYROXINE SODIUM 0.07 MG/1
TABLET ORAL
Qty: 90 TABLET | Refills: 1 | Status: SHIPPED | OUTPATIENT
Start: 2023-01-27

## 2023-01-27 RX ORDER — FLUOXETINE HYDROCHLORIDE 20 MG/1
CAPSULE ORAL
Qty: 180 CAPSULE | Refills: 1 | Status: SHIPPED | OUTPATIENT
Start: 2023-01-27

## 2023-07-18 DIAGNOSIS — E06.3 HYPOTHYROIDISM DUE TO HASHIMOTO'S THYROIDITIS: ICD-10-CM

## 2023-07-18 DIAGNOSIS — E03.8 HYPOTHYROIDISM DUE TO HASHIMOTO'S THYROIDITIS: ICD-10-CM

## 2023-07-19 RX ORDER — BUPROPION HYDROCHLORIDE 300 MG/1
300 TABLET ORAL DAILY
Qty: 30 TABLET | Refills: 0 | Status: SHIPPED | OUTPATIENT
Start: 2023-07-19 | End: 2023-08-18

## 2023-07-19 RX ORDER — LEVOTHYROXINE SODIUM 0.07 MG/1
75 TABLET ORAL
Qty: 30 TABLET | Refills: 0 | Status: SHIPPED | OUTPATIENT
Start: 2023-07-19 | End: 2023-08-18

## 2023-07-24 RX ORDER — FLUOXETINE HYDROCHLORIDE 20 MG/1
CAPSULE ORAL
Qty: 60 CAPSULE | Refills: 0 | Status: SHIPPED | OUTPATIENT
Start: 2023-07-24

## 2023-07-24 NOTE — TELEPHONE ENCOUNTER
No refill protocol for this medication. Last refill: 1/27/2023 #180 with 1 refill  Last Visit: 8/02/2022 Telemed with Dr. Delphine Mendez  Next Visit: No future appointments. Forward to Dr. Delphine Mendez please advise on refills. Thanks.

## 2023-07-24 NOTE — TELEPHONE ENCOUNTER
Unable to leave detailed message due to out-dated verbal, lvm asking pt to call our office.  Looking to relay provider notes below

## 2023-07-25 NOTE — TELEPHONE ENCOUNTER
Left message detail message per (HIPPA form) with recommendation.  Patient to call back and schedule a physical

## 2023-08-24 DIAGNOSIS — E03.8 HYPOTHYROIDISM DUE TO HASHIMOTO'S THYROIDITIS: ICD-10-CM

## 2023-08-24 DIAGNOSIS — E06.3 HYPOTHYROIDISM DUE TO HASHIMOTO'S THYROIDITIS: ICD-10-CM

## 2023-08-24 RX ORDER — LEVOTHYROXINE SODIUM 0.07 MG/1
75 TABLET ORAL
Qty: 30 TABLET | Refills: 0 | Status: CANCELLED | OUTPATIENT
Start: 2023-08-24 | End: 2023-09-23

## 2023-08-24 NOTE — TELEPHONE ENCOUNTER
Received a fax from [facility] for Levothyroxine 75mcg    Last OV:08/02/2022 telemed Dr Genoveva Parra  Last refill:07/19/2023, 30 tabs, 0 refill   No future appointments.     Medication pended, please sign if appropriate

## 2023-08-29 RX ORDER — FLUOXETINE HYDROCHLORIDE 20 MG/1
40 CAPSULE ORAL DAILY
Qty: 60 CAPSULE | Refills: 0 | Status: SHIPPED | OUTPATIENT
Start: 2023-08-29 | End: 2023-09-01

## 2023-08-29 RX ORDER — BUPROPION HYDROCHLORIDE 300 MG/1
300 TABLET ORAL DAILY
Qty: 30 TABLET | Refills: 0 | Status: SHIPPED | OUTPATIENT
Start: 2023-08-29 | End: 2023-09-01

## 2023-09-01 ENCOUNTER — OFFICE VISIT (OUTPATIENT)
Dept: FAMILY MEDICINE CLINIC | Facility: CLINIC | Age: 38
End: 2023-09-01
Payer: COMMERCIAL

## 2023-09-01 VITALS
RESPIRATION RATE: 16 BRPM | WEIGHT: 181 LBS | TEMPERATURE: 98 F | DIASTOLIC BLOOD PRESSURE: 80 MMHG | SYSTOLIC BLOOD PRESSURE: 120 MMHG | OXYGEN SATURATION: 98 % | BODY MASS INDEX: 32.07 KG/M2 | HEIGHT: 63 IN | HEART RATE: 77 BPM

## 2023-09-01 DIAGNOSIS — E03.8 HYPOTHYROIDISM DUE TO HASHIMOTO'S THYROIDITIS: ICD-10-CM

## 2023-09-01 DIAGNOSIS — Z23 NEED FOR VACCINATION: ICD-10-CM

## 2023-09-01 DIAGNOSIS — F43.23 ADJUSTMENT REACTION WITH ANXIETY AND DEPRESSION: ICD-10-CM

## 2023-09-01 DIAGNOSIS — E06.3 HYPOTHYROIDISM DUE TO HASHIMOTO'S THYROIDITIS: ICD-10-CM

## 2023-09-01 DIAGNOSIS — R73.03 PREDIABETES: ICD-10-CM

## 2023-09-01 DIAGNOSIS — R53.83 OTHER FATIGUE: ICD-10-CM

## 2023-09-01 DIAGNOSIS — G43.709 CHRONIC MIGRAINE WITHOUT AURA WITHOUT STATUS MIGRAINOSUS, NOT INTRACTABLE: ICD-10-CM

## 2023-09-01 DIAGNOSIS — Z12.4 SCREENING FOR CERVICAL CANCER: ICD-10-CM

## 2023-09-01 DIAGNOSIS — Z00.00 GENERAL MEDICAL EXAM: Primary | ICD-10-CM

## 2023-09-01 DIAGNOSIS — R25.1 TREMOR OF BOTH HANDS: ICD-10-CM

## 2023-09-01 DIAGNOSIS — E28.2 PCOS (POLYCYSTIC OVARIAN SYNDROME): ICD-10-CM

## 2023-09-01 LAB
ALBUMIN SERPL-MCNC: 4.1 G/DL (ref 3.4–5)
ALBUMIN/GLOB SERPL: 1.3 {RATIO} (ref 1–2)
ALP LIVER SERPL-CCNC: 56 U/L
ALT SERPL-CCNC: 25 U/L
ANION GAP SERPL CALC-SCNC: 5 MMOL/L (ref 0–18)
AST SERPL-CCNC: 15 U/L (ref 15–37)
BASOPHILS # BLD AUTO: 0.03 X10(3) UL (ref 0–0.2)
BASOPHILS NFR BLD AUTO: 0.4 %
BILIRUB SERPL-MCNC: 0.3 MG/DL (ref 0.1–2)
BUN BLD-MCNC: 10 MG/DL (ref 7–18)
CALCIUM BLD-MCNC: 8.8 MG/DL (ref 8.5–10.1)
CHLORIDE SERPL-SCNC: 109 MMOL/L (ref 98–112)
CHOLEST SERPL-MCNC: 203 MG/DL (ref ?–200)
CO2 SERPL-SCNC: 24 MMOL/L (ref 21–32)
CREAT BLD-MCNC: 0.9 MG/DL
EGFRCR SERPLBLD CKD-EPI 2021: 84 ML/MIN/1.73M2 (ref 60–?)
EOSINOPHIL # BLD AUTO: 0.07 X10(3) UL (ref 0–0.7)
EOSINOPHIL NFR BLD AUTO: 0.9 %
ERYTHROCYTE [DISTWIDTH] IN BLOOD BY AUTOMATED COUNT: 14.3 %
FASTING PATIENT LIPID ANSWER: NO
FASTING STATUS PATIENT QL REPORTED: NO
GLOBULIN PLAS-MCNC: 3.2 G/DL (ref 2.8–4.4)
GLUCOSE BLD-MCNC: 72 MG/DL (ref 70–99)
HCT VFR BLD AUTO: 36.3 %
HDLC SERPL-MCNC: 80 MG/DL (ref 40–59)
HGB BLD-MCNC: 11.6 G/DL
IMM GRANULOCYTES # BLD AUTO: 0.02 X10(3) UL (ref 0–1)
IMM GRANULOCYTES NFR BLD: 0.3 %
LDLC SERPL CALC-MCNC: 95 MG/DL (ref ?–100)
LYMPHOCYTES # BLD AUTO: 1.98 X10(3) UL (ref 1–4)
LYMPHOCYTES NFR BLD AUTO: 26.9 %
MCH RBC QN AUTO: 26 PG (ref 26–34)
MCHC RBC AUTO-ENTMCNC: 32 G/DL (ref 31–37)
MCV RBC AUTO: 81.2 FL
MONOCYTES # BLD AUTO: 1.01 X10(3) UL (ref 0.1–1)
MONOCYTES NFR BLD AUTO: 13.7 %
NEUTROPHILS # BLD AUTO: 4.26 X10 (3) UL (ref 1.5–7.7)
NEUTROPHILS # BLD AUTO: 4.26 X10(3) UL (ref 1.5–7.7)
NEUTROPHILS NFR BLD AUTO: 57.8 %
NONHDLC SERPL-MCNC: 123 MG/DL (ref ?–130)
OSMOLALITY SERPL CALC.SUM OF ELEC: 284 MOSM/KG (ref 275–295)
PLATELET # BLD AUTO: 376 10(3)UL (ref 150–450)
POTASSIUM SERPL-SCNC: 3.7 MMOL/L (ref 3.5–5.1)
PROT SERPL-MCNC: 7.3 G/DL (ref 6.4–8.2)
RBC # BLD AUTO: 4.47 X10(6)UL
SODIUM SERPL-SCNC: 138 MMOL/L (ref 136–145)
TRIGL SERPL-MCNC: 169 MG/DL (ref 30–149)
TSI SER-ACNC: 3.58 MIU/ML (ref 0.36–3.74)
VIT D+METAB SERPL-MCNC: 24.5 NG/ML (ref 30–100)
VLDLC SERPL CALC-MCNC: 28 MG/DL (ref 0–30)
WBC # BLD AUTO: 7.4 X10(3) UL (ref 4–11)

## 2023-09-01 PROCEDURE — 80053 COMPREHEN METABOLIC PANEL: CPT | Performed by: NURSE PRACTITIONER

## 2023-09-01 PROCEDURE — 80061 LIPID PANEL: CPT | Performed by: NURSE PRACTITIONER

## 2023-09-01 PROCEDURE — 82306 VITAMIN D 25 HYDROXY: CPT | Performed by: NURSE PRACTITIONER

## 2023-09-01 PROCEDURE — 84443 ASSAY THYROID STIM HORMONE: CPT | Performed by: NURSE PRACTITIONER

## 2023-09-01 PROCEDURE — 88175 CYTOPATH C/V AUTO FLUID REDO: CPT | Performed by: NURSE PRACTITIONER

## 2023-09-01 PROCEDURE — 85025 COMPLETE CBC W/AUTO DIFF WBC: CPT | Performed by: NURSE PRACTITIONER

## 2023-09-01 PROCEDURE — 83036 HEMOGLOBIN GLYCOSYLATED A1C: CPT | Performed by: NURSE PRACTITIONER

## 2023-09-01 PROCEDURE — 87624 HPV HI-RISK TYP POOLED RSLT: CPT | Performed by: NURSE PRACTITIONER

## 2023-09-01 RX ORDER — FLUOXETINE HYDROCHLORIDE 20 MG/1
40 CAPSULE ORAL DAILY
Qty: 180 CAPSULE | Refills: 1 | Status: SHIPPED | OUTPATIENT
Start: 2023-09-01 | End: 2023-11-30

## 2023-09-01 RX ORDER — BUPROPION HYDROCHLORIDE 300 MG/1
300 TABLET ORAL DAILY
Qty: 90 TABLET | Refills: 1 | Status: SHIPPED | OUTPATIENT
Start: 2023-09-01 | End: 2023-11-30

## 2023-09-02 LAB
EST. AVERAGE GLUCOSE BLD GHB EST-MCNC: 120 MG/DL (ref 68–126)
HBA1C MFR BLD: 5.8 % (ref ?–5.7)

## 2023-09-04 DIAGNOSIS — E06.3 HYPOTHYROIDISM DUE TO HASHIMOTO'S THYROIDITIS: ICD-10-CM

## 2023-09-04 DIAGNOSIS — E03.8 HYPOTHYROIDISM DUE TO HASHIMOTO'S THYROIDITIS: ICD-10-CM

## 2023-09-04 RX ORDER — LEVOTHYROXINE SODIUM 0.07 MG/1
75 TABLET ORAL
Qty: 90 TABLET | Refills: 3 | Status: SHIPPED | OUTPATIENT
Start: 2023-09-04 | End: 2023-12-03

## 2023-09-05 LAB — HPV I/H RISK 1 DNA SPEC QL NAA+PROBE: NEGATIVE

## 2023-09-20 ENCOUNTER — PATIENT MESSAGE (OUTPATIENT)
Dept: FAMILY MEDICINE CLINIC | Facility: CLINIC | Age: 38
End: 2023-09-20

## 2023-09-21 RX ORDER — SUMATRIPTAN 50 MG/1
50 TABLET, FILM COATED ORAL EVERY 2 HOUR PRN
Qty: 9 TABLET | Refills: 1 | Status: SHIPPED | OUTPATIENT
Start: 2023-09-21

## 2023-12-18 ENCOUNTER — OFFICE VISIT (OUTPATIENT)
Dept: FAMILY MEDICINE CLINIC | Facility: CLINIC | Age: 38
End: 2023-12-18
Payer: COMMERCIAL

## 2023-12-18 ENCOUNTER — HOSPITAL ENCOUNTER (OUTPATIENT)
Dept: GENERAL RADIOLOGY | Age: 38
Discharge: HOME | End: 2023-12-18
Attending: NURSE PRACTITIONER
Payer: COMMERCIAL

## 2023-12-18 VITALS
DIASTOLIC BLOOD PRESSURE: 86 MMHG | WEIGHT: 178 LBS | SYSTOLIC BLOOD PRESSURE: 132 MMHG | HEART RATE: 95 BPM | BODY MASS INDEX: 32 KG/M2 | OXYGEN SATURATION: 99 % | TEMPERATURE: 100 F

## 2023-12-18 DIAGNOSIS — J06.9 ACUTE URI: ICD-10-CM

## 2023-12-18 DIAGNOSIS — R05.1 ACUTE COUGH: ICD-10-CM

## 2023-12-18 DIAGNOSIS — J06.9 ACUTE URI: Primary | ICD-10-CM

## 2023-12-18 DIAGNOSIS — J02.9 SORE THROAT: ICD-10-CM

## 2023-12-18 DIAGNOSIS — R50.9 FEVER, UNSPECIFIED FEVER CAUSE: ICD-10-CM

## 2023-12-18 LAB
CONTROL LINE PRESENT WITH A CLEAR BACKGROUND (YES/NO): YES YES/NO
COVID19 BINAX NOW ANTIGEN: NOT DETECTED
KIT LOT #: 6668 NUMERIC
OPERATOR ID: NORMAL
STREP GRP A CUL-SCR: NEGATIVE

## 2023-12-18 PROCEDURE — 71046 X-RAY EXAM CHEST 2 VIEWS: CPT | Performed by: NURSE PRACTITIONER

## 2023-12-18 PROCEDURE — 87081 CULTURE SCREEN ONLY: CPT | Performed by: NURSE PRACTITIONER

## 2023-12-18 PROCEDURE — 87637 SARSCOV2&INF A&B&RSV AMP PRB: CPT | Performed by: NURSE PRACTITIONER

## 2023-12-18 RX ORDER — ALBUTEROL SULFATE 90 UG/1
1-2 AEROSOL, METERED RESPIRATORY (INHALATION) EVERY 4 HOURS PRN
Qty: 1 EACH | Refills: 0 | Status: SHIPPED | OUTPATIENT
Start: 2023-12-18

## 2023-12-19 ENCOUNTER — TELEPHONE (OUTPATIENT)
Dept: FAMILY MEDICINE CLINIC | Facility: CLINIC | Age: 38
End: 2023-12-19

## 2023-12-19 LAB
FLUAV + FLUBV RNA SPEC NAA+PROBE: DETECTED
FLUAV + FLUBV RNA SPEC NAA+PROBE: NOT DETECTED
RSV RNA SPEC NAA+PROBE: NOT DETECTED
SARS-COV-2 RNA RESP QL NAA+PROBE: NOT DETECTED

## 2023-12-19 NOTE — TELEPHONE ENCOUNTER
----- Message from CORKY Titus sent at 12/19/2023  3:35 PM CST -----  Patient positive for Influenza A   Supportive care, no antibiotics needed for viral infection

## 2024-02-28 ENCOUNTER — OFFICE VISIT (OUTPATIENT)
Dept: NEUROLOGY | Facility: CLINIC | Age: 39
End: 2024-02-28
Payer: COMMERCIAL

## 2024-02-28 VITALS
DIASTOLIC BLOOD PRESSURE: 80 MMHG | BODY MASS INDEX: 31 KG/M2 | RESPIRATION RATE: 16 BRPM | SYSTOLIC BLOOD PRESSURE: 118 MMHG | HEART RATE: 80 BPM | WEIGHT: 175 LBS

## 2024-02-28 DIAGNOSIS — G43.009 MIGRAINE WITHOUT AURA AND WITHOUT STATUS MIGRAINOSUS, NOT INTRACTABLE: ICD-10-CM

## 2024-02-28 DIAGNOSIS — G25.0 ESSENTIAL TREMOR: Primary | ICD-10-CM

## 2024-02-28 PROCEDURE — 3079F DIAST BP 80-89 MM HG: CPT | Performed by: OTHER

## 2024-02-28 PROCEDURE — 99204 OFFICE O/P NEW MOD 45 MIN: CPT | Performed by: OTHER

## 2024-02-28 PROCEDURE — 3074F SYST BP LT 130 MM HG: CPT | Performed by: OTHER

## 2024-02-28 RX ORDER — LEVOTHYROXINE SODIUM 0.07 MG/1
TABLET ORAL
COMMUNITY
Start: 2024-02-20

## 2024-02-28 RX ORDER — PROPRANOLOL HCL 60 MG
60 CAPSULE, EXTENDED RELEASE 24HR ORAL DAILY
Qty: 30 CAPSULE | Refills: 3 | Status: SHIPPED | OUTPATIENT
Start: 2024-02-28

## 2024-02-28 RX ORDER — BUPROPION HYDROCHLORIDE 300 MG/1
TABLET ORAL
COMMUNITY
Start: 2024-01-02

## 2024-02-28 RX ORDER — ACETAMINOPHEN AND CODEINE PHOSPHATE 300; 30 MG/1; MG/1
1 TABLET ORAL EVERY 8 HOURS PRN
COMMUNITY
Start: 2023-12-29 | End: 2024-02-28 | Stop reason: ALTCHOICE

## 2024-02-28 RX ORDER — RIZATRIPTAN BENZOATE 10 MG/1
TABLET ORAL
Qty: 12 TABLET | Refills: 0 | Status: SHIPPED | OUTPATIENT
Start: 2024-02-28

## 2024-02-28 RX ORDER — FLUOXETINE HYDROCHLORIDE 20 MG/1
CAPSULE ORAL
COMMUNITY
Start: 2023-12-31

## 2024-02-28 NOTE — H&P
Neurology H&P    Evelyn Iyer Patient Status:  No patient class for patient encounter    1985 MRN [id number]   Location [facility] Attending No att. providers found   Hosp Day # 0 PCP Melissa Dewitt MD     Subjective:  Evelyn Iyer is a(n) 38 year old female with a PMH significant for migraines, tension headaches, tremors and PCOS. She comes to the neurology clinic for evaluation of tremors and headaches.  She states that she has had migraines for 20+ years, She states. She states that she gets headaches a lot when the weather changes suddenly. She gets maybe 3 headaches per month which can turn into migraines. She states that the pain can change locations on her head. She state that she has sensitivity to light and sound but no nausea or vomiting. She states that wine can trigger her migraines. She does not know of any family members with migraines. She has had an MRI int he past which as noted below did not show any significant. She has neve been on any preventative medication. She has tried imitrx. She states that imitrex does work but makes her sleepy. She also has mild tremors in her hands, She states that these are fine tremors and esperanza to get worse when stressed out or hungry. She has an aunt and niece with similar tremors. She has not kept track of her headache frequency so cannot tell me exactly how many headaches she has in a month.    Current Medications:  Current Outpatient Medications   Medication Sig Dispense Refill    buPROPion  MG Oral Tablet 24 Hr       FLUoxetine 20 MG Oral Cap       levothyroxine 75 MCG Oral Tab       SUMAtriptan (IMITREX) 50 MG Oral Tab Take 1 tablet (50 mg total) by mouth every 2 (two) hours as needed for Migraine. Use at onset; repeat once after 2 HRS-ONLY 4 IN 24 HR MAX 9 tablet 1    Magnesium 100 MG Oral Tab Take 1 tablet (100 mg total) by mouth at bedtime.      Cholecalciferol (VITAMIN D3) 25 MCG (1000  UT) Oral Cap Take 1 tablet by mouth daily.         Problem List:  Patient Active Problem List   Diagnosis    Migraine    Tension headache    PCOS (polycystic ovarian syndrome)    Hypothyroidism due to Hashimoto's thyroiditis    Adjustment reaction with anxiety and depression       PMHx:  Past Medical History:   Diagnosis Date    Anxiety 2014/2015    On and off depending on situation- not constant    Depression 2014 / 2015    Hypothyroidism 2014?    Dr Terry found it & currently on medication for it    Migraine     MRSA infection 2006    leg    Obesity     Vitamin D deficiency 06/30/2014       PSHx:  Past Surgical History:   Procedure Laterality Date    COLONOSCOPY  2007?    Eating and keeping food down was problematic, lots of nausea    OTHER SURGICAL HISTORY  22yo    breast reconstruction d/t congenital anomalyt       SocHx:  Social History     Socioeconomic History    Marital status: Single   Tobacco Use    Smoking status: Never    Smokeless tobacco: Never   Vaping Use    Vaping Use: Never used   Substance and Sexual Activity    Alcohol use: Yes     Alcohol/week: 2.0 standard drinks of alcohol     Types: 2 Glasses of wine per week     Comment: Sometimes dont drink at all - it depends on the week    Drug use: No   Other Topics Concern    Caffeine Concern Yes     Comment: Get headaches when don't have any    Exercise Yes    Seat Belt No    Special Diet No    Stress Concern No    Weight Concern Yes     Comment: Gained significant amount of weight in the last 2 years       Family History:  Family History   Problem Relation Age of Onset    Diabetes Father         Type 2 Onset - not born with it    Lipids Father         High cholesterol i think    Hypertension Father     Hypertension Mother     Lipids Mother         High cholesterol i think    Heart Disorder Paternal Grandmother         stroke    Stroke Paternal Grandmother         Got it very late in life but recovered           ROS:  10 point ROS completed and was  negative, except for pertinent positive and negatives stated in subjective.    Objective/Physical Exam:    Vital Signs:  Blood pressure 118/80, pulse 80, resp. rate 16, weight 175 lb (79.4 kg), last menstrual period 12/17/2023, not currently breastfeeding.    Gen: Awake and in no apparent distress  HEENT: moist mucus membranes  Neck: Supple  Cardiovascular: Regular rate and rhythm, no murmur  Pulm: CTAB  GI: non-tender, normal bowel sounds  Skin: normal, dry  Extremities: No clubbing or cyanosis      Neurologic:   MENTAL STATUS: alert, ox3, normal attention, language and fund of knowledge.      CRANIAL NERVES II to XII: PERRLA, no ptosis or diplopia, EOM intact, facial sensation intact, strong eye closure, face is symmetric, no dysarthria, tongue midline,  no tongue fasciculations or atrophy, strong shoulder shrug.    MOTOR EXAMINATION: normal tone, no fasciculations, normal strength throughout in UEs and LEs     SENSORY EXAMINATION:  UE: intact to light touch, pinprick intact  LE: intact to light touch, pinprick intact    COORDINATION:  No dysmetria, or intention tremors, positive action tremor    REFLEXES: 3+ at biceps, 3+ brachioradialis, 2+ at patella, 2+ at the ankles. + L hoffmans    GAIT: normal stance, normal toe gait and heel gait, tandem well.             Labs:       Imaging:  MRI Brain 5/9/19  1. No acute intracranial abnormality.   2. Minimal frontal lobe predominant white matter changes involving both cerebral hemispheres, which can be seen in the setting of chronic migraine headaches or reflect a normal variant for patient age.   3. Adenoid enlargement, likely a reactive finding given patient age.   4. Lesser incidental findings as above.   Assessment:  This is a 37 y/o female with past medical history of migraine and tension type headaches, and essential tremor.  Tremors might be medication induced as well as she is on bupropion.  They do not seem to be too bad today.  States they do wax and wane.  We  can start propranolol 60 mg ER today for both her migraine headaches tension headaches and her essential tremor.  Will switch her sumatriptan to rizatriptan she states that sumatriptan causes  way too much sleepiness.  MRI of the brain was reviewed today.      Plan:  Essential tremors  - Possibly medication induced as well  - Start propranolol 60mg ER Qday    2. Migraine w/o aura  - Start Propranolol 60mg ER Qday  - Switch sumatriptan to rizatriptan  - MRI brain reviewed    Cory Maharaj DO  Neurology

## 2024-02-28 NOTE — PROGRESS NOTES
Pt reports 3 headaches per week.  Pt states headaches are weather and stress related.    Pt reports photophobia, phonophobia and nausea.  No vomiting.    Pt denies any specific pattern to headaches.     Pt reports bilateral tremors that have been present for a long time.  She reports worsening of tremors in the last year, increase with stress and lack of food.

## 2024-02-28 NOTE — PATIENT INSTRUCTIONS
After your visit at the [facility] today,  please direct any follow up questions or medication needs to the staff in our [place] office so that your concerns may be promptly addressed.  We are available through [employer] or at the numbers below:    The phone number is:   [phone] option #1    The fax number is:  [phone]    Your pharmacy should also send any requests electronically to the [place] office.  Refill policies:    Allow 2-3 business days for refills; controlled substances may take longer.  Contact your pharmacy at least 5 days prior to running out of medication and have them send an electronic request or submit request through the “request refill” option in your [employer] account.  Refills are not addressed on weekends; covering physicians do not authorize routine medications on weekends.  No narcotics or controlled substances are refilled after noon on Fridays or by on call physicians.  By law, narcotics must be electronically prescribed.  A 30 day supply with no refills is the maximum allowed.  If your prescription is due for a refill, you may be due for a follow up appointment.  To best provide you care, patients receiving routine medications need to be seen at least once a year.  Patients receiving narcotic/controlled substance medications need to be seen at least once every 3 months.  In the event that your preferred pharmacy does not have the requested medication in stock (e.g. Backordered), it is your responsibility to find another pharmacy that has the requested medication available.  We will gladly send a new prescription to that pharmacy at your request.    Scheduling Tests:    If your physician has ordered radiology tests such as MRI or CT scans, please contact Central Scheduling at [phone] right away to schedule the test.  Once scheduled, the [facility] Centralized Referral Team will work with your insurance carrier to obtain pre-certification or prior authorization.   Depending on your insurance carrier, approval may take 3-10 days.  It is highly recommended patients assure they have received an authorization before having a test performed.  If test is done without insurance authorization, patient may be responsible for the entire amount billed.      Precertification and Prior Authorizations:  If your physician has recommended that you have a procedure or additional testing performed the [facility] Centralized Referral Team will contact your insurance carrier to obtain pre-certification or prior authorization.    You are strongly encouraged to contact your insurance carrier to verify that your procedure/test has been approved and is a COVERED benefit.  Although the [facility] Centralized Referral Team does its due diligence, the insurance carrier gives the disclaimer that \"Although the procedure is authorized, this does not guarantee payment.\"    Ultimately the patient is responsible for payment.   Thank you for your understanding in this matter.  Paperwork Completion:  If you require FMLA or disability paperwork for your recovery, please make sure to either drop it off or have it faxed to our office at [phone]. Be sure the form has your name and date of birth on it.  The form will be faxed to our Forms Department and they will complete it for you.  There is a 25$ fee for all forms that need to be filled out.  Please be aware there is a 10-14 day turnaround time.  You will need to sign a release of information (ELIDA) form if your paperwork does not come with one.  You may call the Forms Department with any questions at [phone].  Their fax number is [phone].

## 2024-03-29 RX ORDER — BUPROPION HYDROCHLORIDE 300 MG/1
300 TABLET ORAL DAILY
Qty: 90 TABLET | Refills: 0 | Status: SHIPPED | OUTPATIENT
Start: 2024-03-29

## 2024-03-29 RX ORDER — FLUOXETINE HYDROCHLORIDE 20 MG/1
40 CAPSULE ORAL DAILY
Qty: 180 CAPSULE | Refills: 0 | Status: SHIPPED | OUTPATIENT
Start: 2024-03-29

## 2024-03-29 NOTE — TELEPHONE ENCOUNTER
Last OV 12/18/23  Last refilled on 9/1/23 for # 90 with 1 refills  Future Appointments   Date Time Provider Department Center   4/11/2024  7:40 AM Tierney Freeman APRN [facility] [place]   6/6/2024  3:00 PM Cory Maharaj DO [id number]        Thank you.

## 2024-04-11 ENCOUNTER — OFFICE VISIT (OUTPATIENT)
Dept: FAMILY MEDICINE CLINIC | Facility: CLINIC | Age: 39
End: 2024-04-11
Payer: COMMERCIAL

## 2024-04-11 ENCOUNTER — TELEPHONE (OUTPATIENT)
Dept: FAMILY MEDICINE CLINIC | Facility: CLINIC | Age: 39
End: 2024-04-11

## 2024-04-11 ENCOUNTER — LAB ENCOUNTER (OUTPATIENT)
Dept: LAB | Age: 39
End: 2024-04-11
Attending: NURSE PRACTITIONER
Payer: COMMERCIAL

## 2024-04-11 VITALS
WEIGHT: 167 LBS | HEART RATE: 64 BPM | TEMPERATURE: 98 F | SYSTOLIC BLOOD PRESSURE: 126 MMHG | DIASTOLIC BLOOD PRESSURE: 66 MMHG | OXYGEN SATURATION: 99 % | BODY MASS INDEX: 30 KG/M2

## 2024-04-11 DIAGNOSIS — E03.8 HYPOTHYROIDISM DUE TO HASHIMOTO'S THYROIDITIS: ICD-10-CM

## 2024-04-11 DIAGNOSIS — E06.3 HYPOTHYROIDISM DUE TO HASHIMOTO'S THYROIDITIS: ICD-10-CM

## 2024-04-11 DIAGNOSIS — R10.13 EPIGASTRIC PAIN: Primary | ICD-10-CM

## 2024-04-11 DIAGNOSIS — F43.23 ADJUSTMENT REACTION WITH ANXIETY AND DEPRESSION: ICD-10-CM

## 2024-04-11 DIAGNOSIS — Z56.6 WORK-RELATED STRESS: ICD-10-CM

## 2024-04-11 DIAGNOSIS — D50.9 IRON DEFICIENCY ANEMIA, UNSPECIFIED IRON DEFICIENCY ANEMIA TYPE: Primary | ICD-10-CM

## 2024-04-11 DIAGNOSIS — R10.13 EPIGASTRIC PAIN: ICD-10-CM

## 2024-04-11 LAB
ALBUMIN SERPL-MCNC: 3.8 G/DL (ref 3.4–5)
ALBUMIN/GLOB SERPL: 1.1 {RATIO} (ref 1–2)
ALP LIVER SERPL-CCNC: 60 U/L
ALT SERPL-CCNC: 24 U/L
ANION GAP SERPL CALC-SCNC: 4 MMOL/L (ref 0–18)
AST SERPL-CCNC: 10 U/L (ref 15–37)
BASOPHILS # BLD AUTO: 0.03 X10(3) UL (ref 0–0.2)
BASOPHILS NFR BLD AUTO: 0.6 %
BILIRUB SERPL-MCNC: 0.4 MG/DL (ref 0.1–2)
BUN BLD-MCNC: 8 MG/DL (ref 9–23)
CALCIUM BLD-MCNC: 9.2 MG/DL (ref 8.5–10.1)
CHLORIDE SERPL-SCNC: 106 MMOL/L (ref 98–112)
CO2 SERPL-SCNC: 26 MMOL/L (ref 21–32)
CREAT BLD-MCNC: 1.02 MG/DL
DEPRECATED HBV CORE AB SER IA-ACNC: 4.7 NG/ML
EGFRCR SERPLBLD CKD-EPI 2021: 72 ML/MIN/1.73M2 (ref 60–?)
EOSINOPHIL # BLD AUTO: 0.09 X10(3) UL (ref 0–0.7)
EOSINOPHIL NFR BLD AUTO: 1.7 %
ERYTHROCYTE [DISTWIDTH] IN BLOOD BY AUTOMATED COUNT: 14.6 %
FASTING STATUS PATIENT QL REPORTED: YES
GLOBULIN PLAS-MCNC: 3.5 G/DL (ref 2.8–4.4)
GLUCOSE BLD-MCNC: 93 MG/DL (ref 70–99)
HCT VFR BLD AUTO: 36.7 %
HGB BLD-MCNC: 11.9 G/DL
IMM GRANULOCYTES # BLD AUTO: 0.01 X10(3) UL (ref 0–1)
IMM GRANULOCYTES NFR BLD: 0.2 %
LYMPHOCYTES # BLD AUTO: 1.51 X10(3) UL (ref 1–4)
LYMPHOCYTES NFR BLD AUTO: 29 %
MCH RBC QN AUTO: 26.6 PG (ref 26–34)
MCHC RBC AUTO-ENTMCNC: 32.4 G/DL (ref 31–37)
MCV RBC AUTO: 82.1 FL
MONOCYTES # BLD AUTO: 0.51 X10(3) UL (ref 0.1–1)
MONOCYTES NFR BLD AUTO: 9.8 %
NEUTROPHILS # BLD AUTO: 3.05 X10 (3) UL (ref 1.5–7.7)
NEUTROPHILS # BLD AUTO: 3.05 X10(3) UL (ref 1.5–7.7)
NEUTROPHILS NFR BLD AUTO: 58.7 %
OSMOLALITY SERPL CALC.SUM OF ELEC: 280 MOSM/KG (ref 275–295)
PLATELET # BLD AUTO: 362 10(3)UL (ref 150–450)
POTASSIUM SERPL-SCNC: 4.1 MMOL/L (ref 3.5–5.1)
PROT SERPL-MCNC: 7.3 G/DL (ref 6.4–8.2)
RBC # BLD AUTO: 4.47 X10(6)UL
SODIUM SERPL-SCNC: 136 MMOL/L (ref 136–145)
T4 FREE SERPL-MCNC: 1.2 NG/DL (ref 0.8–1.7)
TSI SER-ACNC: 2.47 MIU/ML (ref 0.36–3.74)
WBC # BLD AUTO: 5.2 X10(3) UL (ref 4–11)

## 2024-04-11 PROCEDURE — 84439 ASSAY OF FREE THYROXINE: CPT | Performed by: NURSE PRACTITIONER

## 2024-04-11 PROCEDURE — 3078F DIAST BP <80 MM HG: CPT | Performed by: NURSE PRACTITIONER

## 2024-04-11 PROCEDURE — 99214 OFFICE O/P EST MOD 30 MIN: CPT | Performed by: NURSE PRACTITIONER

## 2024-04-11 PROCEDURE — 80050 GENERAL HEALTH PANEL: CPT | Performed by: NURSE PRACTITIONER

## 2024-04-11 PROCEDURE — 3074F SYST BP LT 130 MM HG: CPT | Performed by: NURSE PRACTITIONER

## 2024-04-11 PROCEDURE — 82728 ASSAY OF FERRITIN: CPT | Performed by: NURSE PRACTITIONER

## 2024-04-11 RX ORDER — NICOTINE POLACRILEX 4 MG/1
20 GUM, CHEWING ORAL 2 TIMES DAILY
Qty: 28 TABLET | Refills: 0 | Status: SHIPPED | OUTPATIENT
Start: 2024-04-11 | End: 2024-04-11 | Stop reason: DRUGHIGH

## 2024-04-11 RX ORDER — FLUOXETINE 10 MG/1
10 CAPSULE ORAL DAILY
Qty: 90 CAPSULE | Refills: 0 | Status: SHIPPED | OUTPATIENT
Start: 2024-04-11 | End: 2024-07-10

## 2024-04-11 RX ORDER — NICOTINE POLACRILEX 4 MG/1
20 GUM, CHEWING ORAL 2 TIMES DAILY
Qty: 90 TABLET | Refills: 0 | Status: SHIPPED | OUTPATIENT
Start: 2024-04-11 | End: 2024-04-11

## 2024-04-11 RX ORDER — NICOTINE POLACRILEX 4 MG/1
20 GUM, CHEWING ORAL DAILY
Qty: 90 TABLET | Refills: 0 | Status: SHIPPED | OUTPATIENT
Start: 2024-04-11 | End: 2024-07-10

## 2024-04-11 SDOH — HEALTH STABILITY - MENTAL HEALTH: OTHER PHYSICAL AND MENTAL STRAIN RELATED TO WORK: Z56.6

## 2024-04-11 NOTE — TELEPHONE ENCOUNTER
----- Message from CORKY Arango sent at 4/11/2024  2:45 PM CDT -----  Chemistries stable  Iron deficiency  Mildly anemic  Needs to start Ferrous Sulfate 325 mg daily with vitamin C 500 mg to help with absorption. Recheck CBC, Ferritin in 2 months. If not improving, see hematology  Thyroid normal

## 2024-04-11 NOTE — PROGRESS NOTES
HPI:    Patient is in office to discuss abdominal pain that has become more frequent. Patient was pain  medications for surgical procedure in January of this year and noticed pain starting during that time. She has discontinued those medications but still is getting epigastric pain intermittently. Describes pain as sharp and burning. Denies nausea, vomiting or changes in bowel habits. Tried Pepto Bismol and that did help initially and is no longer effective.    She reports increased stress since starting in November 2023.Currently on bupropion and fluoxetine.          Current Outpatient Medications   Medication Sig Dispense Refill    FLUoxetine 10 MG Oral Cap Take 1 capsule (10 mg total) by mouth daily. Along with 40mg dosage to total 50mg total daily 90 capsule 0    Omeprazole 20 MG Oral Tab EC Take 20 mg by mouth daily. 90 tablet 0    FLUoxetine 20 MG Oral Cap Take 2 capsules (40 mg total) by mouth daily. 180 capsule 0    buPROPion  MG Oral Tablet 24 Hr Take 1 tablet (300 mg total) by mouth daily. 90 tablet 0    levothyroxine 75 MCG Oral Tab       Propranolol HCl ER 60 MG Oral Capsule SR 24 Hr Take 1 capsule (60 mg total) by mouth daily. 30 capsule 3    Rizatriptan Benzoate 10 MG Oral Tab use at onset; may repeat once after 2 hours- ONLY 2 IN 24 HOUR PERIOD MAX.  This is a 30 day supply. 12 tablet 0    Magnesium 100 MG Oral Tab Take 1 tablet (100 mg total) by mouth at bedtime.      Cholecalciferol (VITAMIN D3) 25 MCG (1000 UT) Oral Cap Take 1 tablet by mouth daily.        Past Medical History:    Anxiety    On and off depending on situation- not constant    Depression    Hypothyroidism    Dr Terry found it & currently on medication for it    Migraine    MRSA infection    leg    Obesity    Vitamin D deficiency      Past Surgical History:   Procedure Laterality Date    Colonoscopy  2007?    Eating and keeping food down was problematic, lots of nausea    Orif radial shaft fracture Left 01/17/2024    Other  surgical history  22yo    breast reconstruction d/t congenital anomalyt      Family History   Problem Relation Age of Onset    Diabetes Father         Type 2 Onset - not born with it    Lipids Father         High cholesterol i think    Hypertension Father     Hypertension Mother     Lipids Mother         High cholesterol i think    Heart Disorder Paternal Grandmother         stroke    Stroke Paternal Grandmother         Got it very late in life but recovered      Social History     Socioeconomic History    Marital status: Single   Tobacco Use    Smoking status: Never    Smokeless tobacco: Never   Vaping Use    Vaping status: Never Used   Substance and Sexual Activity    Alcohol use: Yes     Alcohol/week: 2.0 standard drinks of alcohol     Types: 2 Glasses of wine per week     Comment: Sometimes dont drink at all - it depends on the week    Drug use: No   Other Topics Concern    Caffeine Concern Yes     Comment: Get headaches when don't have any    Exercise Yes    Seat Belt No    Special Diet No    Stress Concern No    Weight Concern Yes     Comment: Gained significant amount of weight in the last 2 years     Social Determinants of Health      [facility]    Housing Stability         REVIEW OF SYSTEMS:   Review of Systems   Constitutional:  Negative for fatigue, fever and unexpected weight change.   HENT:  Negative for sore throat and trouble swallowing.    Respiratory:  Negative for cough, chest tightness and shortness of breath.    Cardiovascular:  Negative for chest pain and palpitations.   Gastrointestinal:  Positive for abdominal pain. Negative for abdominal distention, blood in stool, constipation, diarrhea, nausea and vomiting.   Endocrine: Negative for cold intolerance and heat intolerance.   Genitourinary:  Negative for difficulty urinating, flank pain, frequency and hematuria.   Neurological:  Negative for light-headedness and headaches.    Psychiatric/Behavioral:  Negative for self-injury and sleep disturbance.        EXAM:   /66   Pulse 64   Temp 97.8 °F (36.6 °C)   Wt 167 lb (75.8 kg)   LMP 03/15/2024   SpO2 99%   BMI 29.58 kg/m²   Physical Exam  Vitals reviewed.   Constitutional:       General: She is not in acute distress.     Appearance: Normal appearance.   HENT:      Head: Normocephalic.   Cardiovascular:      Rate and Rhythm: Normal rate and regular rhythm.      Pulses: Normal pulses.      Heart sounds: Normal heart sounds.   Pulmonary:      Effort: Pulmonary effort is normal.      Breath sounds: Normal breath sounds.   Abdominal:      General: Bowel sounds are normal.      Palpations: Abdomen is soft. There is no mass.      Tenderness: There is no abdominal tenderness. There is no guarding.   Skin:     General: Skin is warm and dry.   Neurological:      General: No focal deficit present.      Mental Status: She is alert and oriented to person, place, and time.   Psychiatric:         Mood and Affect: Mood normal.         Behavior: Behavior normal.         ASSESSMENT AND PLAN:   Diagnoses and all orders for this visit:    Epigastric pain  Comments:  Omeprazole 20mg daily and referral to [facility] for evaluation.  Orders:  -     CBC W Differential W Platelet [E]; Future  -     Comp Metabolic Panel (14) [E]; Future  -     Ferritin [E]; Future  -     Discontinue: Omeprazole 20 MG Oral Tab EC; Take 20 mg by mouth in the morning and 20 mg before bedtime.  -     Omeprazole 20 MG Oral Tab EC; Take 20 mg by mouth daily.    Hypothyroidism due to Hashimoto's thyroiditis  Comments:  Patient educated on taking Levothyroxine alone in the morning prior to any meals or other medications.  Orders:  -     TSH and Free T4 [E]; Future    Work-related stress  Comments:  Addition of Fluoxetine 10mg to current 40mg dosage. Total daily dosage of 50mg  Orders:  -     FLUoxetine 10 MG Oral Cap; Take 1 capsule (10 mg total) by mouth daily. Along with  40mg dosage to total 50mg total daily    Adjustment reaction with anxiety and depression  -     FLUoxetine 10 MG Oral Cap; Take 1 capsule (10 mg total) by mouth daily. Along with 40mg dosage to total 50mg total daily    Other orders  -     Discontinue: Omeprazole 20 MG Oral Tab EC; Take 20 mg by mouth in the morning and 20 mg before bedtime. Do all this for 14 days.     Patient was advised of lab orders placed today as well as medication requested. Patient was involved in plan of care and verbalized understanding. Once lab results have been reviewed assessment of current plan and future follow ups will be discussed    Allegra NORIEGA RN, an APN student, has documented in this chart.  .

## 2024-04-11 NOTE — PROGRESS NOTES
Patient evaluated with student nurse practitioner  Independent physical examine performed    /66   Pulse 64   Temp 97.8 °F (36.6 °C)   Wt 167 lb (75.8 kg)   LMP 03/15/2024   SpO2 99%   BMI 29.58 kg/m²   Physical Exam  Constitutional:       General: She is not in acute distress.     Appearance: Normal appearance. She is overweight.   Cardiovascular:      Rate and Rhythm: Normal rate and regular rhythm.      Heart sounds: Normal heart sounds.   Pulmonary:      Effort: Pulmonary effort is normal.      Breath sounds: Normal breath sounds.   Abdominal:      General: Bowel sounds are normal.      Palpations: Abdomen is soft.      Tenderness: There is no abdominal tenderness.   Neurological:      Mental Status: She is alert.   Psychiatric:         Attention and Perception: Attention normal.         Mood and Affect: Affect is tearful.         Speech: Speech normal.         Behavior: Behavior normal.       Diagnoses and all orders for this visit:    Epigastric pain  Comments:  Omeprazole 20mg daily and [facility] to [facility].  Orders:  -     CBC W Differential W Platelet [E]; Future  -     Comp Metabolic Panel (14) [E]; Future  -     Ferritin [E]; Future  -     Discontinue: Omeprazole 20 MG Oral Tab EC; Take 20 mg by mouth in the morning and 20 mg before bedtime.  -     Omeprazole 20 MG Oral Tab EC; Take 20 mg by mouth daily.  -     GASTRO - INTERNAL    Work-related stress  Comments:  Addition of Fluoxetine 10mg to current 40mg dosage. Total daily dosage of 50mg  Orders:  -     FLUoxetine 10 MG Oral Cap; Take 1 capsule (10 mg total) by mouth daily. Along with 40mg dosage to total 50mg total daily    Hypothyroidism due to Hashimoto's thyroiditis  Comments:  Patient educated on taking Levothyroxine alone in the morning prior to any meals or other medications.  Orders:  -     TSH and Free T4 [E]; Future    Adjustment reaction with anxiety and depression  -     FLUoxetine 10 MG Oral Cap; Take 1  capsule (10 mg total) by mouth daily. Along with 40mg dosage to total 50mg total daily    Other orders  -     Discontinue: Omeprazole 20 MG Oral Tab EC; Take 20 mg by mouth in the morning and 20 mg before bedtime. Do all this for 14 days.    Checking labs today  Suspect acute gastritis related to recent increased NSAID use  Trial PPI, discussed dietary measures to avoid GERD sx  Increase Fluoxetine to 50 mg daily. Call with update in 2-3 weeks

## 2024-05-06 ENCOUNTER — OFFICE VISIT (OUTPATIENT)
Dept: FAMILY MEDICINE CLINIC | Facility: CLINIC | Age: 39
End: 2024-05-06
Payer: COMMERCIAL

## 2024-05-06 VITALS
DIASTOLIC BLOOD PRESSURE: 68 MMHG | HEIGHT: 62 IN | TEMPERATURE: 99 F | RESPIRATION RATE: 16 BRPM | WEIGHT: 169 LBS | OXYGEN SATURATION: 99 % | HEART RATE: 70 BPM | SYSTOLIC BLOOD PRESSURE: 100 MMHG | BODY MASS INDEX: 31.1 KG/M2

## 2024-05-06 DIAGNOSIS — J02.9 SORE THROAT: Primary | ICD-10-CM

## 2024-05-06 DIAGNOSIS — G44.209 ACUTE NON INTRACTABLE TENSION-TYPE HEADACHE: ICD-10-CM

## 2024-05-06 LAB
CONTROL LINE PRESENT WITH A CLEAR BACKGROUND (YES/NO): YES YES/NO
KIT LOT #: NORMAL NUMERIC
STREP GRP A CUL-SCR: NEGATIVE

## 2024-05-06 PROCEDURE — 87637 SARSCOV2&INF A&B&RSV AMP PRB: CPT | Performed by: NURSE PRACTITIONER
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
## 2024-05-07 ENCOUNTER — PATIENT MESSAGE (OUTPATIENT)
Dept: FAMILY MEDICINE CLINIC | Facility: CLINIC | Age: 39
End: 2024-05-07

## 2024-05-07 ENCOUNTER — TELEPHONE (OUTPATIENT)
Dept: FAMILY MEDICINE CLINIC | Facility: CLINIC | Age: 39
End: 2024-05-07

## 2024-05-07 DIAGNOSIS — U07.1 COVID: Primary | ICD-10-CM

## 2024-05-07 LAB
FLUAV + FLUBV RNA SPEC NAA+PROBE: NOT DETECTED
FLUAV + FLUBV RNA SPEC NAA+PROBE: NOT DETECTED
RSV RNA SPEC NAA+PROBE: NOT DETECTED
SARS-COV-2 RNA RESP QL NAA+PROBE: DETECTED

## 2024-05-07 NOTE — TELEPHONE ENCOUNTER
From: Evelyn Iyer  To: Bina Flannery  Sent: 5/7/2024 4:17 AM CDT  Subject: Headaches    Jimmy Curran, what can I take with the DayQuil and NyQuil to get rid of my headache? It has not subsided at all, actually getting worse. Please let me know what else I can take. Thanks! Evelyn

## 2024-05-07 NOTE — TELEPHONE ENCOUNTER
Addressed and rx sent 5/7      Please call Evelyn to let her know she is + for covid.     We are still within the window to treat with an antiviral if she is not feeling any better today.    I sent MCM, but want to touch base with her to see if she wants me to [facility]lovid [facility] to [facility]

## 2024-05-07 NOTE — TELEPHONE ENCOUNTER
From: Evelyn Iyer  To: Bina Flannery  Sent: 5/7/2024 2:25 PM CDT  Subject: Antiviral     Hi Bina  I’m definitely still feeling crappy so I would like the antiviral. I’m assuming I need a prescription, can you please send it to my pharmacy?   Also, thank you for getting back to me about the headaches. Your suggestion really helped. Thank you! Evelyn

## 2024-05-10 NOTE — TELEPHONE ENCOUNTER
Patient notified and verbalized understanding.    Patient states swelling started this morning at 6 am.  Last took paxlovid at  9 am  Took benadryl around 10 and 330  States swelling is going down. Asking if she should still go to ER    Bina FRIED notified and verbalized understanding.  Per Bina, still needs to go for evaluation.    Patient notified and verbalized understanding.

## 2024-06-04 ENCOUNTER — OFFICE VISIT (OUTPATIENT)
Dept: NEUROLOGY | Facility: CLINIC | Age: 39
End: 2024-06-04
Payer: COMMERCIAL

## 2024-06-04 VITALS
BODY MASS INDEX: 32 KG/M2 | SYSTOLIC BLOOD PRESSURE: 106 MMHG | HEART RATE: 64 BPM | DIASTOLIC BLOOD PRESSURE: 66 MMHG | RESPIRATION RATE: 16 BRPM | WEIGHT: 172.63 LBS

## 2024-06-04 DIAGNOSIS — G44.209 TENSION HEADACHE: ICD-10-CM

## 2024-06-04 DIAGNOSIS — G43.009 MIGRAINE WITHOUT AURA AND WITHOUT STATUS MIGRAINOSUS, NOT INTRACTABLE: ICD-10-CM

## 2024-06-04 DIAGNOSIS — G25.0 ESSENTIAL TREMOR: Primary | ICD-10-CM

## 2024-06-04 PROCEDURE — 3074F SYST BP LT 130 MM HG: CPT | Performed by: OTHER

## 2024-06-04 PROCEDURE — 3078F DIAST BP <80 MM HG: CPT | Performed by: OTHER

## 2024-06-04 PROCEDURE — 99213 OFFICE O/P EST LOW 20 MIN: CPT | Performed by: OTHER

## 2024-06-04 NOTE — PROGRESS NOTES
Neurology H&P    Evelyn Iyer Patient Status:  No patient class for patient encounter    1985 MRN [id number]   Location [facility], [facility] Attending No att. providers found   Hosp Day # 0 PCP Melissa Dewitt MD     Subjective:  Initial Clinic HPI 2024:  Evelyn Iyer is a(n) 38 year old female with a PMH significant for migraines, tension headaches, tremors and PCOS. She comes to the neurology clinic for evaluation of tremors and headaches.  She states that she has had migraines for 20+ years, She states. She states that she gets headaches a lot when the weather changes suddenly. She gets maybe 3 headaches per month which can turn into migraines. She states that the pain can change locations on her head. She state that she has sensitivity to light and sound but no nausea or vomiting. She states that wine can trigger her migraines. She does not know of any family members with migraines. She has had an MRI int he past which as noted below did not show any significant. She has neve been on any preventative medication. She has tried imitrx. She states that imitrex does work but makes her sleepy. She also has mild tremors in her hands, She states that these are fine tremors and esperanza to get worse when stressed out or hungry. She has an aunt and niece with similar tremors. She has not kept track of her headache frequency so cannot tell me exactly how many headaches she has in a month.    Interim history:  Patient was last seen in clinic on 2024.  At that point in time we started rizatriptan and propranolol for her migraines and essential tremor.  She comes back to clinic for follow-up today.  She states that she has not had any migraines since starting propranolol. She also states that her tremors are significantly improved. She states that while she has not had any migraines she still gets annoying headaches fairly frequently. She gets between 5 and 10  tension type headaches per month.     Current Medications:  Current Outpatient Medications   Medication Sig Dispense Refill    Omeprazole 20 MG Oral Tab EC Take 20 mg by mouth daily. 90 tablet 0    FLUoxetine 10 MG Oral Cap Take 1 capsule (10 mg total) by mouth daily. Along with 40mg dosage to total 50mg total daily 90 capsule 0    FLUoxetine 20 MG Oral Cap Take 2 capsules (40 mg total) by mouth daily. 180 capsule 0    buPROPion  MG Oral Tablet 24 Hr Take 1 tablet (300 mg total) by mouth daily. 90 tablet 0    levothyroxine 75 MCG Oral Tab       Propranolol HCl ER 60 MG Oral Capsule SR 24 Hr Take 1 capsule (60 mg total) by mouth daily. 30 capsule 3    Rizatriptan Benzoate 10 MG Oral Tab use at onset; may repeat once after 2 hours- ONLY 2 IN 24 HOUR PERIOD MAX.  This is a 30 day supply. 12 tablet 0    Magnesium 100 MG Oral Tab Take 1 tablet (100 mg total) by mouth at bedtime.      Cholecalciferol (VITAMIN D3) 25 MCG (1000 UT) Oral Cap Take 1 tablet by mouth daily.         Problem List:  Patient Active Problem List   Diagnosis    Migraine    Tension headache    PCOS (polycystic ovarian syndrome)    Hypothyroidism due to Hashimoto's thyroiditis    Adjustment reaction with anxiety and depression    Essential tremor       PMHx:  Past Medical History:    Abdominal pain    Anxiety    On and off depending on situation- not constant    Back pain    Belching    Bloating    Body piercing    Chest pain    Constipation    Decorative tattoo    Depression    Diarrhea, unspecified    Fatigue    Flatulence/gas pain/belching    Food intolerance    Headache disorder    Heartburn    Heavy menses    Hemorrhoids    History of depression    Hypothyroidism    Dr Terry found it & currently on medication for it    Indigestion    Loss of appetite    Menses painful    Migraine    MRSA infection    leg    Nausea    Night sweats    Obesity    Pain with bowel movements    Skin blushing/flushing    Sleep disturbance    Stress    Vitamin D  deficiency    Vomiting    Wears glasses    Weight gain    Weight loss       PSHx:  Past Surgical History:   Procedure Laterality Date    Colonoscopy  ?    Eating and keeping food down was problematic, lots of nausea    Orif radial shaft fracture Left 2024    Other surgical history  22yo    breast reconstruction d/t congenital anomalyt       SocHx:  Social History     Socioeconomic History    Marital status: Single   Tobacco Use    Smoking status: Never    Smokeless tobacco: Never   Vaping Use    Vaping status: Never Used   Substance and Sexual Activity    Alcohol use: Yes     Alcohol/week: 2.0 standard drinks of alcohol     Types: 2 Glasses of wine per week     Comment: Sometimes dont drink at all - it depends on the week    Drug use: No   Other Topics Concern    Caffeine Concern Yes     Comment: Get headaches when don't have any    Exercise Yes    Seat Belt No    Special Diet No    Stress Concern No    Weight Concern Yes     Comment: Gained significant amount of weight in the last 2 years     Social Determinants of Health      [facility]    Housing Stability       Family History:  Family History   Problem Relation Age of Onset    Diabetes Father         Type 2 Onset - not born with it    Lipids Father         High cholesterol i think    Hypertension Father     Heart Attack Father         3-4 since ;  of heart failure in 2023    Hypertension Mother     Lipids Mother         High cholesterol i think    Heart Disorder Paternal Grandmother         stroke    Stroke Paternal Grandmother         Got it very late in life but recovered           ROS:  10 point ROS completed and was negative, except for pertinent positive and negatives stated in subjective.    Objective/Physical Exam:    Vital Signs:  Blood pressure 106/66, pulse 64, resp. rate 16, weight 172 lb 9.9 oz (78.3 kg), last menstrual period 03/15/2024, not currently  breastfeeding.    Gen: Awake and in no apparent distress  HEENT: moist mucus membranes  Neck: Supple  Cardiovascular: Regular rate and rhythm, no murmur  Pulm: CTAB  GI: non-tender, normal bowel sounds  Skin: normal, dry  Extremities: No clubbing or cyanosis      Neurologic:   MENTAL STATUS: alert, ox3, normal attention, language and fund of knowledge.      CRANIAL NERVES II to XII: PERRLA, no ptosis or diplopia, EOM intact, facial sensation intact, strong eye closure, face is symmetric, no dysarthria, tongue midline,  no tongue fasciculations or atrophy, strong shoulder shrug.    MOTOR EXAMINATION: normal tone, no fasciculations, normal strength throughout in UEs and LEs     SENSORY EXAMINATION:  UE: intact to light touch, pinprick intact  LE: intact to light touch, pinprick intact    COORDINATION:  No dysmetria, or intention tremors, positive action tremor    REFLEXES: 3+ at biceps, 3+ brachioradialis, 2+ at patella, 2+ at the ankles. + L hoffmans    GAIT: normal stance, normal toe gait and heel gait, tandem well.             Labs:       Imaging:  MRI Brain 5/9/19  1. No acute intracranial abnormality.   2. Minimal frontal lobe predominant white matter changes involving both cerebral hemispheres, which can be seen in the setting of chronic migraine headaches or reflect a normal variant for patient age.   3. Adenoid enlargement, likely a reactive finding given patient age.   4. Lesser incidental findings as above.     Assessment:  This is a 37 y/o female with past medical history of migraine and tension type headaches, and essential tremor.  Tremors might be medication induced as well as she is on bupropion. She states that the propanolol has significantly reduced her tremors and also she has not had a single migraine since starting the propranolol. She has more frequent tension type headaches. I did discuss that we could try topamax but that I was uncertain if this would reduce her tension headaches or not but we  could try. She declines any additional medications at this time      Plan:  Essential tremors  - Possibly medication induced as well  - propranolol 60mg ER Qday    2. Migraine w/o aura  - Propranolol 60mg ER Qday  -  rizatriptan     RTC in 6 months    Cory Maharaj, DO  Neurology

## 2024-06-04 NOTE — PATIENT INSTRUCTIONS
After your visit at the [facility] today,  please direct any follow up questions or medication needs to the staff in our [place] office so that your concerns may be promptly addressed.  We are available through [employer] or at the numbers below:    The phone number is:   [phone] option #1    The fax number is:  [phone]    Your pharmacy should also send any requests electronically to the [place] office.  Refill policies:    Allow 2-3 business days for refills; controlled substances may take longer.  Contact your pharmacy at least 5 days prior to running out of medication and have them send an electronic request or submit request through the “request refill” option in your [employer] account.  Refills are not addressed on weekends; covering physicians do not authorize routine medications on weekends.  No narcotics or controlled substances are refilled after noon on Fridays or by on call physicians.  By law, narcotics must be electronically prescribed.  A 30 day supply with no refills is the maximum allowed.  If your prescription is due for a refill, you may be due for a follow up appointment.  To best provide you care, patients receiving routine medications need to be seen at least once a year.  Patients receiving narcotic/controlled substance medications need to be seen at least once every 3 months.  In the event that your preferred pharmacy does not have the requested medication in stock (e.g. Backordered), it is your responsibility to find another pharmacy that has the requested medication available.  We will gladly send a new prescription to that pharmacy at your request.    Scheduling Tests:    If your physician has ordered radiology tests such as MRI or CT scans, please contact Central Scheduling at [phone] right away to schedule the test.  Once scheduled, the [facility] Centralized Referral Team will work with your insurance carrier to obtain pre-certification or prior authorization.   Depending on your insurance carrier, approval may take 3-10 days.  It is highly recommended patients assure they have received an authorization before having a test performed.  If test is done without insurance authorization, patient may be responsible for the entire amount billed.      Precertification and Prior Authorizations:  If your physician has recommended that you have a procedure or additional testing performed the [facility] Centralized Referral Team will contact your insurance carrier to obtain pre-certification or prior authorization.    You are strongly encouraged to contact your insurance carrier to verify that your procedure/test has been approved and is a COVERED benefit.  Although the [facility] Centralized Referral Team does its due diligence, the insurance carrier gives the disclaimer that \"Although the procedure is authorized, this does not guarantee payment.\"    Ultimately the patient is responsible for payment.   Thank you for your understanding in this matter.  Paperwork Completion:  If you require FMLA or disability paperwork for your recovery, please make sure to either drop it off or have it faxed to our office at [phone]. Be sure the form has your name and date of birth on it.  The form will be faxed to our Forms Department and they will complete it for you.  There is a 25$ fee for all forms that need to be filled out.  Please be aware there is a 10-14 day turnaround time.  You will need to sign a release of information (ELIDA) form if your paperwork does not come with one.  You may call the Forms Department with any questions at [phone].  Their fax number is [phone].

## 2024-06-04 NOTE — PROGRESS NOTES
Pt reports propranolol and helped tremors. Pt reports she has not had any migraines since starting propranolol, but does report frequent headaches.

## 2024-06-11 ENCOUNTER — TELEPHONE (OUTPATIENT)
Dept: FAMILY MEDICINE CLINIC | Facility: CLINIC | Age: 39
End: 2024-06-11

## 2024-06-25 DIAGNOSIS — G43.009 MIGRAINE WITHOUT AURA AND WITHOUT STATUS MIGRAINOSUS, NOT INTRACTABLE: Primary | ICD-10-CM

## 2024-06-25 RX ORDER — PROPRANOLOL HCL 60 MG
1 CAPSULE, EXTENDED RELEASE 24HR ORAL DAILY
Qty: 30 CAPSULE | Refills: 5 | Status: SHIPPED | OUTPATIENT
Start: 2024-06-25

## 2024-06-25 NOTE — TELEPHONE ENCOUNTER
Medication: PROPRANOLOL HCL ER 60 MG Oral Capsule SR 24 Hr      Date of last refill: 02/28/2024 (#30/3)  [place]     Last office visit: 06/04/2024  Due back to clinic per last office note:  Around 12/04/2024  Date next office visit scheduled:    Future Appointments   Date Time Provider Department Center   12/4/2024  9:00 AM Cory Maharaj DO [id number]           Last OV note recommendation:    Assessment:  This is a 37 y/o female with past medical history of migraine and tension type headaches, and essential tremor.  Tremors might be medication induced as well as she is on bupropion. She states that the propanolol has significantly reduced her tremors and also she has not had a single migraine since starting the propranolol. She has more frequent tension type headaches. I did discuss that we could try topamax but that I was uncertain if this would reduce her tension headaches or not but we could try. She declines any additional medications at this time        Plan:  Essential tremors  - Possibly medication induced as well  - propranolol 60mg ER Qday     2. Migraine w/o aura  - Propranolol 60mg ER Qday  -  rizatriptan     RTC in 6 months     Cory Maharaj DO  Neurology

## 2024-07-01 RX ORDER — BUPROPION HYDROCHLORIDE 300 MG/1
300 TABLET ORAL DAILY
Qty: 90 TABLET | Refills: 0 | Status: SHIPPED | OUTPATIENT
Start: 2024-07-01

## 2024-07-01 RX ORDER — FLUOXETINE HYDROCHLORIDE 20 MG/1
40 CAPSULE ORAL DAILY
Qty: 180 CAPSULE | Refills: 0 | Status: SHIPPED | OUTPATIENT
Start: 2024-07-01

## 2024-07-01 NOTE — TELEPHONE ENCOUNTER
Last office visit 5/6/24 with Bina  Last refilled on 3/29/24 for # 90 with 0 refills  Future Appointments   Date Time Provider Department Center   12/4/2024  9:00 AM Cory Maharaj DO [id number]        Thank you.

## 2024-07-11 DIAGNOSIS — Z56.6 WORK-RELATED STRESS: ICD-10-CM

## 2024-07-11 DIAGNOSIS — F43.23 ADJUSTMENT REACTION WITH ANXIETY AND DEPRESSION: ICD-10-CM

## 2024-07-11 RX ORDER — FLUOXETINE 10 MG/1
CAPSULE ORAL
Qty: 90 CAPSULE | Refills: 0 | OUTPATIENT
Start: 2024-07-11

## 2024-07-11 SDOH — HEALTH STABILITY - MENTAL HEALTH: OTHER PHYSICAL AND MENTAL STRAIN RELATED TO WORK: Z56.6

## 2024-08-23 ENCOUNTER — PATIENT MESSAGE (OUTPATIENT)
Dept: FAMILY MEDICINE CLINIC | Facility: CLINIC | Age: 39
End: 2024-08-23

## 2024-08-23 ENCOUNTER — OFFICE VISIT (OUTPATIENT)
Dept: FAMILY MEDICINE CLINIC | Facility: CLINIC | Age: 39
End: 2024-08-23
Payer: COMMERCIAL

## 2024-08-23 VITALS
OXYGEN SATURATION: 99 % | BODY MASS INDEX: 33.49 KG/M2 | TEMPERATURE: 98 F | SYSTOLIC BLOOD PRESSURE: 117 MMHG | RESPIRATION RATE: 18 BRPM | HEIGHT: 62 IN | DIASTOLIC BLOOD PRESSURE: 80 MMHG | HEART RATE: 66 BPM | WEIGHT: 182 LBS

## 2024-08-23 DIAGNOSIS — B02.8 HERPES ZOSTER WITH OTHER COMPLICATION: Primary | ICD-10-CM

## 2024-08-23 PROCEDURE — 3008F BODY MASS INDEX DOCD: CPT | Performed by: NURSE PRACTITIONER

## 2024-08-23 PROCEDURE — 3074F SYST BP LT 130 MM HG: CPT | Performed by: NURSE PRACTITIONER

## 2024-08-23 PROCEDURE — G2211 COMPLEX E/M VISIT ADD ON: HCPCS | Performed by: NURSE PRACTITIONER

## 2024-08-23 PROCEDURE — 3079F DIAST BP 80-89 MM HG: CPT | Performed by: NURSE PRACTITIONER

## 2024-08-23 PROCEDURE — 99213 OFFICE O/P EST LOW 20 MIN: CPT | Performed by: NURSE PRACTITIONER

## 2024-08-23 RX ORDER — PREDNISONE 20 MG/1
20 TABLET ORAL 2 TIMES DAILY
Qty: 10 TABLET | Refills: 0 | Status: SHIPPED | OUTPATIENT
Start: 2024-08-23 | End: 2024-08-28

## 2024-08-23 RX ORDER — TRIAMCINOLONE ACETONIDE 5 MG/G
1 CREAM TOPICAL 3 TIMES DAILY
Qty: 30 G | Refills: 0 | Status: SHIPPED | OUTPATIENT
Start: 2024-08-23 | End: 2024-08-28

## 2024-08-23 RX ORDER — VALACYCLOVIR HYDROCHLORIDE 1 G/1
1000 TABLET, FILM COATED ORAL EVERY 12 HOURS SCHEDULED
Qty: 14 TABLET | Refills: 0 | Status: SHIPPED | OUTPATIENT
Start: 2024-08-23 | End: 2024-08-30

## 2024-08-23 NOTE — TELEPHONE ENCOUNTER
From: Evelyn Iyer  To: Tierney Freeman  Sent: 8/23/2024 11:17 AM CDT  Subject: Shingles    Hi again. My fiancé touched my rash yesterday thinking it was just an allergic reaction to something. Is there anything he can do to make sure he didn’t and doesn't contract it?

## 2024-08-23 NOTE — TELEPHONE ENCOUNTER
Unfortunately no. He needs to monitor for any unusual symptoms at this point  Avoid future contact until rash has scabbed or resolved  Good hand washing

## 2024-08-23 NOTE — PROGRESS NOTES
HPI:   38 year old female presents for painful rash that showed up yesterday morning on her front rib area and her mid spine on the right side as well as pain in that area since Sunday. Has never had anything like this before. Reports that it is painful to the touch but does not itch. Reports that it has slightly spread since onset yesterday. Patient has not tried treating with anything topical or oral. Nothing makes it better or worse. States that she had lower limb swelling over the weekend that has since gone away. Patient is unsure if something may have bitten her as she did get mosquito bites recently. Patient reports that she did have chicken pox as a kid. Unsure if she was vaccinated against chicken pox.          Current Outpatient Medications   Medication Sig Dispense Refill    valACYclovir 1 G Oral Tab Take 1 tablet (1,000 mg total) by mouth every 12 (twelve) hours for 7 days. 14 tablet 0    predniSONE 20 MG Oral Tab Take 1 tablet (20 mg total) by mouth 2 (two) times daily for 5 days. 10 tablet 0    triamcinolone 0.5 % External Cream Apply 1 Application topically 3 (three) times daily for 5 days. 30 g 0    FLUOXETINE 20 MG Oral Cap TAKE 2 CAPSULES(40 MG) BY MOUTH DAILY 180 capsule 0    BUPROPION  MG Oral Tablet 24 Hr TAKE 1 TABLET(300 MG) BY MOUTH DAILY 90 tablet 0    PROPRANOLOL HCL ER 60 MG Oral Capsule SR 24 Hr TAKE 1 CAPSULE(60 MG) BY MOUTH DAILY 30 capsule 5    levothyroxine 75 MCG Oral Tab       Rizatriptan Benzoate 10 MG Oral Tab use at onset; may repeat once after 2 hours- ONLY 2 IN 24 HOUR PERIOD MAX.  This is a 30 day supply. 12 tablet 0    Magnesium 100 MG Oral Tab Take 1 tablet (100 mg total) by mouth at bedtime.      Cholecalciferol (VITAMIN D3) 25 MCG (1000 UT) Oral Cap Take 1 tablet by mouth daily.        Past Medical History:    Abdominal pain    Anxiety    On and off depending on situation- not constant    Back pain    Belching    Bloating    Body piercing    Chest pain     Constipation    Decorative tattoo    Depression    Diarrhea, unspecified    Fatigue    Flatulence/gas pain/belching    Food intolerance    Headache disorder    Heartburn    Heavy menses    Hemorrhoids    History of depression    Hypothyroidism    Dr Terry found it & currently on medication for it    Indigestion    Loss of appetite    Menses painful    Migraine    MRSA infection    leg    Nausea    Night sweats    Obesity    Pain with bowel movements    Skin blushing/flushing    Sleep disturbance    Stress    Vitamin D deficiency    Vomiting    Wears glasses    Weight gain    Weight loss      Past Surgical History:   Procedure Laterality Date    Colonoscopy  ?    Eating and keeping food down was problematic, lots of nausea    Orif radial shaft fracture Left 2024    Other surgical history  22yo    breast reconstruction d/t congenital anomalyt      Family History   Problem Relation Age of Onset    Diabetes Father         Type 2 Onset - not born with it    Lipids Father         High cholesterol i think    Hypertension Father     Heart Attack Father         3-4 since ;  of heart failure in 2023    Hypertension Mother     Lipids Mother         High cholesterol i think    Heart Disorder Paternal Grandmother         stroke    Stroke Paternal Grandmother         Got it very late in life but recovered      Social History     Socioeconomic History    Marital status: Single   Tobacco Use    Smoking status: Never     Passive exposure: Never    Smokeless tobacco: Never   Vaping Use    Vaping status: Never Used   Substance and Sexual Activity    Alcohol use: Yes     Alcohol/week: 2.0 standard drinks of alcohol     Types: 2 Glasses of wine per week     Comment: Sometimes dont drink at all - it depends on the week    Drug use: No   Other Topics Concern    Caffeine Concern Yes     Comment: Get headaches when don't have any    Exercise Yes    Seat Belt No    Special Diet No    Stress Concern No    Weight  Concern Yes     Comment: Gained significant amount of weight in the last 2 years     Social Determinants of Health      Received from [facility], [facility]    Housing Stability         REVIEW OF SYSTEMS:   Review of Systems   Constitutional:  Negative for fatigue and fever.   HENT:  Negative for hearing loss.    Eyes:  Negative for visual disturbance.   Respiratory:  Negative for cough and shortness of breath.    Cardiovascular:  Negative for chest pain and palpitations.   Gastrointestinal:  Negative for constipation, diarrhea, nausea and vomiting.   Musculoskeletal:  Negative for joint swelling and myalgias.        Lower extremity swelling    Skin:  Positive for rash.   Neurological:  Negative for dizziness, light-headedness, numbness and headaches.   Psychiatric/Behavioral:  Negative for agitation, behavioral problems and confusion.        EXAM:   /80 (BP Location: Right arm, Patient Position: Sitting, Cuff Size: adult)   Pulse 66   Temp 97.7 °F (36.5 °C)   Resp 18   Ht 5' 2\" (1.575 m)   Wt 182 lb (82.6 kg)   LMP 08/12/2024 (Approximate)   SpO2 99%   BMI 33.29 kg/m²   Physical Exam  Constitutional:       Appearance: Normal appearance. She is obese.   HENT:      Head: Normocephalic and atraumatic.   Cardiovascular:      Rate and Rhythm: Normal rate and regular rhythm.      Pulses: Normal pulses.      Heart sounds: No murmur heard.     No friction rub. No gallop.   Pulmonary:      Effort: Pulmonary effort is normal. No respiratory distress.      Breath sounds: Normal breath sounds. No wheezing or rales.   Abdominal:      General: There is no distension.      Tenderness: There is no abdominal tenderness. There is no guarding.   Musculoskeletal:         General: Swelling present.   Skin:     Findings: Rash present.   Neurological:      General: No focal deficit present.      Mental Status: She is alert and oriented to person, place, and time. Mental status is at  baseline.      Cranial Nerves: No cranial nerve deficit.      Motor: No weakness.   Psychiatric:         Mood and Affect: Mood normal.         Behavior: Behavior normal.         Thought Content: Thought content normal.         Judgment: Judgment normal.         ASSESSMENT AND PLAN:   Diagnoses and all orders for this visit:    Herpes zoster with other complication  -     valACYclovir 1 G Oral Tab; Take 1 tablet (1,000 mg total) by mouth every 12 (twelve) hours for 7 days.  -     predniSONE 20 MG Oral Tab; Take 1 tablet (20 mg total) by mouth 2 (two) times daily for 5 days.  -     triamcinolone 0.5 % External Cream; Apply 1 Application topically 3 (three) times daily for 5 days.    Supportive care discussed  Return to clinic if worsening or persisting

## 2024-08-26 RX ORDER — ACETAMINOPHEN AND CODEINE PHOSPHATE 300; 30 MG/1; MG/1
1 TABLET ORAL EVERY 6 HOURS PRN
Qty: 10 TABLET | Refills: 0 | Status: SHIPPED | OUTPATIENT
Start: 2024-08-26

## 2024-08-26 NOTE — TELEPHONE ENCOUNTER
Jimmy Monet. Couple of questions - should I take the valacyclovir and the prednisone at the same time or space them apart? If space them apart, then how many hours in between? Also, I thought I was going to get something for the pain, like Tylenol with codeine? Thanks!

## 2024-09-24 NOTE — TELEPHONE ENCOUNTER
Psychiatric Non-Scheduled (Anti-Anxiety) [id number]/24/2024 12:57 PM   Protocol Details In person appointment or virtual visit in the past 6 mos or appointment in next 3 mos    Depression Screening completed within the past 12 months     Request for  FLUOXETINE 20 MG Oral Cap     LOV 8/23/24 with Tierney Freeman APRN   Last refill 7/1/24 - 180 capsules 0 refill   Future Appointments   Date Time Provider Department Center   12/4/2024  9:00 AM Cory Maharaj DO [id number]   Labs 4/11/24     Psychiatric Non-Scheduled (Anti-Anxiety) [id number]/24/2024 12:57 PM   Protocol Details In person appointment or virtual visit in the past 6 mos or appointment in next 3 mos    Depression Screening completed within the past 12 months     Request for BUPROPION  MG Oral Tablet 24 Hr     LOV 8/23/24 with Tierney Freeman,   Last refill 7/1/24 - 90 tablets 0 refill   Future Appointments   Date Time Provider Department Center   12/4/2024  9:00 AM Cory Maharaj DO [id number]   Labs 4/11/24

## 2024-09-25 RX ORDER — BUPROPION HYDROCHLORIDE 300 MG/1
300 TABLET ORAL DAILY
Qty: 90 TABLET | Refills: 0 | Status: SHIPPED | OUTPATIENT
Start: 2024-09-25

## 2024-11-25 RX ORDER — LEVOTHYROXINE SODIUM 75 UG/1
75 TABLET ORAL
Qty: 30 TABLET | Refills: 0 | Status: SHIPPED | OUTPATIENT
Start: 2024-11-25

## 2024-11-25 NOTE — TELEPHONE ENCOUNTER
Name from pharmacy: LEVOTHYROXINE 0.075MG (75MCG) TABS         Will file in chart as: LEVOTHYROXINE 75 MCG Oral Tab    Sig: TAKE 1 TABLET(75 MCG) BY MOUTH BEFORE BREAKFAST    Disp: 30 tablet    Refills: 0 (Pharmacy requested: Not specified)    Start: 11/22/2024    Class: Normal    Non-formulary    Last ordered: 9 months ago (2/28/2024) by Reported External/Patient    Last refill: 8/29/2023    Rx #: [id number]    Thyroid Medication Protocol Ivkrds3411/22/2024 09:36 PM   Protocol Details TSH in past 12 months    Last TSH value is normal    In person appointment or virtual visit in the past 12 mos or appointment in next 3 mos       Name from pharmacy: FLUOXETINE 20MG CAPSULES         Will file in chart as: FLUOXETINE 20 MG Oral Cap    Sig: TAKE 2 CAPSULES(40 MG) BY MOUTH DAILY    Disp: 60 capsule    Refills: 0 (Pharmacy requested: Not specified)    Start: 11/22/2024    Class: Normal    Non-formulary    Last ordered: 2 months ago (9/25/2024) by CORKY Arango    Last refill: 8/29/2023    Rx #: [id number]    Psychiatric Non-Scheduled (Anti-Anxiety) Snlcmf1111/22/2024 09:36 PM   Protocol Details In person appointment or virtual visit in the past 6 mos or appointment in next 3 mos    Depression Screening completed within the past 12 months      To be filled at: [id number] [place] [address] AT [facility] [address], [phone], [phone]

## 2024-12-18 ENCOUNTER — TELEPHONE (OUTPATIENT)
Dept: FAMILY MEDICINE CLINIC | Facility: CLINIC | Age: 39
End: 2024-12-18

## 2024-12-18 NOTE — TELEPHONE ENCOUNTER
Saw note on MyChart appointment  Area may need drainage, sometimes small drain placed  I would recommend that she actually schedule with gyne for infected Bartholin's cyst.   If they do not have openings, recommend Immediate Care or ER evaluation

## 2024-12-18 NOTE — TELEPHONE ENCOUNTER
Future Appointments   Date Time Provider Department Center   12/20/2024  7:40 AM Tierney Freeman APRN [facility] [place]

## 2024-12-20 RX ORDER — BUPROPION HYDROCHLORIDE 300 MG/1
300 TABLET ORAL DAILY
Qty: 90 TABLET | Refills: 0 | Status: SHIPPED | OUTPATIENT
Start: 2024-12-20

## 2024-12-20 NOTE — TELEPHONE ENCOUNTER
Last office visit 8/23/24  Last refilled on 9/25/24 for # 90 with 3 refills  No future appointments.     Thank you.

## 2024-12-21 DIAGNOSIS — G43.009 MIGRAINE WITHOUT AURA AND WITHOUT STATUS MIGRAINOSUS, NOT INTRACTABLE: ICD-10-CM

## 2024-12-23 RX ORDER — PROPRANOLOL HYDROCHLORIDE 60 MG/1
1 CAPSULE, EXTENDED RELEASE ORAL DAILY
Qty: 30 CAPSULE | Refills: 5 | Status: SHIPPED | OUTPATIENT
Start: 2024-12-23

## 2024-12-23 RX ORDER — LEVOTHYROXINE SODIUM 75 UG/1
75 TABLET ORAL
Qty: 30 TABLET | Refills: 0 | Status: SHIPPED | OUTPATIENT
Start: 2024-12-23

## 2024-12-23 NOTE — TELEPHONE ENCOUNTER
Medication: PROPRANOLOL HCL ER 60 MG Oral Capsule SR 24 Hr      Date of last refill: 06/25/2024 (#30/5)  [place]     Last office visit: 06/04/2024  Due back to clinic per last office note:  6 months  Date next office visit scheduled:    No future appointments.        Last OV note recommendation:    Assessment:  This is a 39 y/o female with past medical history of migraine and tension type headaches, and essential tremor.  Tremors might be medication induced as well as she is on bupropion. She states that the propanolol has significantly reduced her tremors and also she has not had a single migraine since starting the propranolol. She has more frequent tension type headaches. I did discuss that we could try topamax but that I was uncertain if this would reduce her tension headaches or not but we could try. She declines any additional medications at this time        Plan:  Essential tremors  - Possibly medication induced as well  - propranolol 60mg ER Qday     2. Migraine w/o aura  - Propranolol 60mg ER Qday  -  rizatriptan     RTC in 6 months     Cory Maharaj, DO  Neurology

## 2024-12-23 NOTE — TELEPHONE ENCOUNTER
Requested Renewals     Name from pharmacy: LEVOTHYROXINE 0.075MG (75MCG) TABS         Will file in chart as: LEVOTHYROXINE 75 MCG Oral Tab    Sig: TAKE 1 TABLET(75 MCG) BY MOUTH BEFORE BREAKFAST    Disp: 30 tablet    Refills: 0 (Pharmacy requested: Not specified)    Start: 12/21/2024    Class: Normal    Non-formulary    Last ordered: 4 weeks ago (11/25/2024) by CORKY Arango    Last refill: 11/25/2024    Rx #: [id number]    Thyroid Medication Protocol [id number]/21/2024 01:29 PM   Protocol Details TSH in past 12 months    Last TSH value is normal    In person appointment or virtual visit in the past 12 mos or appointment in next 3 mos      To be filled at: [id number] [place] [address] AT [facility] [address], [phone], [phone]

## 2025-01-17 RX ORDER — LEVOTHYROXINE SODIUM 75 UG/1
75 TABLET ORAL
Qty: 90 TABLET | Refills: 0 | Status: SHIPPED | OUTPATIENT
Start: 2025-01-17

## 2025-01-17 NOTE — TELEPHONE ENCOUNTER
Patients last physical was September 2023. Scheduled.   Future Appointments   Date Time Provider Department Center   1/31/2025  8:20 AM Tierney Freeman APRN [facility] [place]

## 2025-01-17 NOTE — TELEPHONE ENCOUNTER
Thyroid Medication Protocol Passed01/17/2025 12:51 PM   Protocol Details TSH in past 12 months    Last TSH value is normal    In person appointment or virtual visit in the past 12 mos or appointment in next 3 mos    Medication is active on med list

## 2025-01-31 ENCOUNTER — LAB ENCOUNTER (OUTPATIENT)
Dept: LAB | Age: 40
End: 2025-01-31
Attending: NURSE PRACTITIONER
Payer: COMMERCIAL

## 2025-01-31 ENCOUNTER — HOSPITAL ENCOUNTER (OUTPATIENT)
Dept: GENERAL RADIOLOGY | Age: 40
Discharge: HOME | End: 2025-01-31
Attending: NURSE PRACTITIONER
Payer: COMMERCIAL

## 2025-01-31 ENCOUNTER — OFFICE VISIT (OUTPATIENT)
Dept: FAMILY MEDICINE CLINIC | Facility: CLINIC | Age: 40
End: 2025-01-31
Payer: COMMERCIAL

## 2025-01-31 ENCOUNTER — TELEPHONE (OUTPATIENT)
Dept: FAMILY MEDICINE CLINIC | Facility: CLINIC | Age: 40
End: 2025-01-31

## 2025-01-31 VITALS
TEMPERATURE: 98 F | DIASTOLIC BLOOD PRESSURE: 70 MMHG | HEART RATE: 68 BPM | BODY MASS INDEX: 34.96 KG/M2 | SYSTOLIC BLOOD PRESSURE: 110 MMHG | HEIGHT: 62 IN | WEIGHT: 190 LBS | RESPIRATION RATE: 99 BRPM

## 2025-01-31 DIAGNOSIS — M25.531 RIGHT WRIST PAIN: ICD-10-CM

## 2025-01-31 DIAGNOSIS — D50.0 IRON DEFICIENCY ANEMIA DUE TO CHRONIC BLOOD LOSS: ICD-10-CM

## 2025-01-31 DIAGNOSIS — R45.86 MOOD SWINGS: ICD-10-CM

## 2025-01-31 DIAGNOSIS — Z00.00 GENERAL MEDICAL EXAM: Primary | ICD-10-CM

## 2025-01-31 DIAGNOSIS — S39.92XA INJURY OF COCCYX, INITIAL ENCOUNTER: ICD-10-CM

## 2025-01-31 DIAGNOSIS — G43.709 CHRONIC MIGRAINE WITHOUT AURA WITHOUT STATUS MIGRAINOSUS, NOT INTRACTABLE: ICD-10-CM

## 2025-01-31 DIAGNOSIS — D50.0 IRON DEFICIENCY ANEMIA DUE TO CHRONIC BLOOD LOSS: Primary | ICD-10-CM

## 2025-01-31 DIAGNOSIS — Z12.31 ENCOUNTER FOR SCREENING MAMMOGRAM FOR MALIGNANT NEOPLASM OF BREAST: ICD-10-CM

## 2025-01-31 DIAGNOSIS — G44.209 TENSION HEADACHE: ICD-10-CM

## 2025-01-31 DIAGNOSIS — N92.0 MENORRHAGIA WITH REGULAR CYCLE: ICD-10-CM

## 2025-01-31 DIAGNOSIS — Z23 NEED FOR VACCINATION: ICD-10-CM

## 2025-01-31 DIAGNOSIS — F43.23 ADJUSTMENT REACTION WITH ANXIETY AND DEPRESSION: ICD-10-CM

## 2025-01-31 DIAGNOSIS — E55.9 VITAMIN D DEFICIENCY: ICD-10-CM

## 2025-01-31 DIAGNOSIS — G25.0 ESSENTIAL TREMOR: ICD-10-CM

## 2025-01-31 DIAGNOSIS — E28.2 PCOS (POLYCYSTIC OVARIAN SYNDROME): ICD-10-CM

## 2025-01-31 DIAGNOSIS — D50.9 IRON DEFICIENCY ANEMIA, UNSPECIFIED IRON DEFICIENCY ANEMIA TYPE: ICD-10-CM

## 2025-01-31 DIAGNOSIS — J31.0 OTHER RHINITIS: ICD-10-CM

## 2025-01-31 DIAGNOSIS — Z00.00 GENERAL MEDICAL EXAM: ICD-10-CM

## 2025-01-31 DIAGNOSIS — E06.3 HYPOTHYROIDISM DUE TO HASHIMOTO'S THYROIDITIS: ICD-10-CM

## 2025-01-31 DIAGNOSIS — E78.5 ELEVATED LIPIDS: ICD-10-CM

## 2025-01-31 LAB
ALBUMIN SERPL-MCNC: 4.8 G/DL (ref 3.2–4.8)
ALBUMIN/GLOB SERPL: 1.8 {RATIO} (ref 1–2)
ALP LIVER SERPL-CCNC: 50 U/L
ALT SERPL-CCNC: 32 U/L
ANION GAP SERPL CALC-SCNC: 10 MMOL/L (ref 0–18)
AST SERPL-CCNC: 22 U/L (ref ?–34)
BASOPHILS # BLD AUTO: 0.03 X10(3) UL (ref 0–0.2)
BASOPHILS NFR BLD AUTO: 0.5 %
BILIRUB SERPL-MCNC: 0.6 MG/DL (ref 0.3–1.2)
BUN BLD-MCNC: 10 MG/DL (ref 9–23)
CALCIUM BLD-MCNC: 9.5 MG/DL (ref 8.7–10.6)
CHLORIDE SERPL-SCNC: 105 MMOL/L (ref 98–112)
CHOLEST SERPL-MCNC: 229 MG/DL (ref ?–200)
CO2 SERPL-SCNC: 26 MMOL/L (ref 21–32)
CREAT BLD-MCNC: 0.99 MG/DL
DEPRECATED HBV CORE AB SER IA-ACNC: 40 NG/ML
EGFRCR SERPLBLD CKD-EPI 2021: 74 ML/MIN/1.73M2 (ref 60–?)
EOSINOPHIL # BLD AUTO: 0.11 X10(3) UL (ref 0–0.7)
EOSINOPHIL NFR BLD AUTO: 1.9 %
ERYTHROCYTE [DISTWIDTH] IN BLOOD BY AUTOMATED COUNT: 12.2 %
EST. AVERAGE GLUCOSE BLD GHB EST-MCNC: 111 MG/DL (ref 68–126)
FASTING PATIENT LIPID ANSWER: YES
FASTING STATUS PATIENT QL REPORTED: YES
GLOBULIN PLAS-MCNC: 2.7 G/DL (ref 2–3.5)
GLUCOSE BLD-MCNC: 96 MG/DL (ref 70–99)
HBA1C MFR BLD: 5.5 % (ref ?–5.7)
HCT VFR BLD AUTO: 44.1 %
HDLC SERPL-MCNC: 60 MG/DL (ref 40–59)
HGB BLD-MCNC: 15.1 G/DL
IMM GRANULOCYTES # BLD AUTO: 0.02 X10(3) UL (ref 0–1)
IMM GRANULOCYTES NFR BLD: 0.4 %
IRON SATN MFR SERPL: 35 %
IRON SERPL-MCNC: 115 UG/DL
LDLC SERPL CALC-MCNC: 148 MG/DL (ref ?–100)
LYMPHOCYTES # BLD AUTO: 1.26 X10(3) UL (ref 1–4)
LYMPHOCYTES NFR BLD AUTO: 22.3 %
MCH RBC QN AUTO: 31.7 PG (ref 26–34)
MCHC RBC AUTO-ENTMCNC: 34.2 G/DL (ref 31–37)
MCV RBC AUTO: 92.6 FL
MONOCYTES # BLD AUTO: 0.55 X10(3) UL (ref 0.1–1)
MONOCYTES NFR BLD AUTO: 9.7 %
NEUTROPHILS # BLD AUTO: 3.68 X10 (3) UL (ref 1.5–7.7)
NEUTROPHILS # BLD AUTO: 3.68 X10(3) UL (ref 1.5–7.7)
NEUTROPHILS NFR BLD AUTO: 65.2 %
NONHDLC SERPL-MCNC: 169 MG/DL (ref ?–130)
OSMOLALITY SERPL CALC.SUM OF ELEC: 291 MOSM/KG (ref 275–295)
PLATELET # BLD AUTO: 315 10(3)UL (ref 150–450)
POTASSIUM SERPL-SCNC: 4.3 MMOL/L (ref 3.5–5.1)
PROT SERPL-MCNC: 7.5 G/DL (ref 5.7–8.2)
RBC # BLD AUTO: 4.76 X10(6)UL
SODIUM SERPL-SCNC: 141 MMOL/L (ref 136–145)
TOTAL IRON BINDING CAPACITY: 332 UG/DL (ref 250–425)
TRANSFERRIN SERPL-MCNC: 261 MG/DL (ref 250–380)
TRIGL SERPL-MCNC: 117 MG/DL (ref 30–149)
TSI SER-ACNC: 4.19 UIU/ML (ref 0.55–4.78)
VIT B12 SERPL-MCNC: 372 PG/ML (ref 211–911)
VIT D+METAB SERPL-MCNC: 37.2 NG/ML (ref 30–100)
VLDLC SERPL CALC-MCNC: 22 MG/DL (ref 0–30)
WBC # BLD AUTO: 5.7 X10(3) UL (ref 4–11)

## 2025-01-31 PROCEDURE — 80061 LIPID PANEL: CPT | Performed by: NURSE PRACTITIONER

## 2025-01-31 PROCEDURE — 80050 GENERAL HEALTH PANEL: CPT | Performed by: NURSE PRACTITIONER

## 2025-01-31 PROCEDURE — 82785 ASSAY OF IGE: CPT | Performed by: NURSE PRACTITIONER

## 2025-01-31 PROCEDURE — 82728 ASSAY OF FERRITIN: CPT | Performed by: NURSE PRACTITIONER

## 2025-01-31 PROCEDURE — 86003 ALLG SPEC IGE CRUDE XTRC EA: CPT | Performed by: NURSE PRACTITIONER

## 2025-01-31 PROCEDURE — 82607 VITAMIN B-12: CPT | Performed by: NURSE PRACTITIONER

## 2025-01-31 PROCEDURE — 83540 ASSAY OF IRON: CPT | Performed by: NURSE PRACTITIONER

## 2025-01-31 PROCEDURE — 82306 VITAMIN D 25 HYDROXY: CPT | Performed by: NURSE PRACTITIONER

## 2025-01-31 PROCEDURE — 72220 X-RAY EXAM SACRUM TAILBONE: CPT | Performed by: NURSE PRACTITIONER

## 2025-01-31 PROCEDURE — 83036 HEMOGLOBIN GLYCOSYLATED A1C: CPT | Performed by: NURSE PRACTITIONER

## 2025-01-31 PROCEDURE — 73110 X-RAY EXAM OF WRIST: CPT | Performed by: NURSE PRACTITIONER

## 2025-01-31 PROCEDURE — 83550 IRON BINDING TEST: CPT | Performed by: NURSE PRACTITIONER

## 2025-01-31 RX ORDER — RIZATRIPTAN BENZOATE 10 MG/1
TABLET ORAL
Qty: 12 TABLET | Refills: 0 | Status: SHIPPED | OUTPATIENT
Start: 2025-01-31

## 2025-01-31 RX ORDER — BUPROPION HYDROCHLORIDE 300 MG/1
300 TABLET ORAL DAILY
Qty: 90 TABLET | Refills: 0 | Status: SHIPPED | OUTPATIENT
Start: 2025-01-31

## 2025-01-31 RX ORDER — FERROUS SULFATE 325(65) MG
325 TABLET, DELAYED RELEASE (ENTERIC COATED) ORAL
COMMUNITY

## 2025-01-31 NOTE — TELEPHONE ENCOUNTER
----- Message from Tierney Freeman sent at 1/31/2025 12:41 PM CST -----  Results reviewed.   No wrist fracture

## 2025-01-31 NOTE — TELEPHONE ENCOUNTER
----- Message from Tierney Freeman sent at 1/31/2025  1:39 PM CST -----  Results reviewed.   Iron deficient but improved, continue daily iron and take with orange juice. Recheck CBC and ferritin in 3 months  Cholesterol has increased from previous. May be related to recent dietary changes. Work on heart healthy diet and lets recheck in 6 months  Normal blood sugar  Vitamin D improved, Continue present management  B12 normal  Thyroid function normal, continue current dose  Chemistries normal

## 2025-01-31 NOTE — TELEPHONE ENCOUNTER
----- Message from Tierney rFeeman sent at 1/31/2025 12:41 PM CST -----  Results reviewed.   No fracture

## 2025-01-31 NOTE — PROGRESS NOTES
HPI:   Patient is here for physical.    Concerns:   Right wrist pain. Fell forward onto counter top. Hyperflexed. Also fell backwards and fell on tailbone. Able to walk and move wrist. Some wrist swelling and bruising. Wearing brace and taking Advil with relief.  Possible perimenopause  Increased mood swings    LMP: 1/30/2025  Menstrual Cycle Length: now monthly, has also been irregular. Flow is varying. Last two months have been heavy but had a light month  PMS: cramps (magnesium helps)  Contraception; condoms occasionally    Last Pap: 9/2026  Last Mammogram: never, order placed    Wt Readings from Last 6 Encounters:   01/31/25 190 lb (86.2 kg)   08/23/24 182 lb (82.6 kg)   06/04/24 172 lb 9.9 oz (78.3 kg)   05/06/24 169 lb (76.7 kg)   05/03/24 170 lb (77.1 kg)   04/11/24 167 lb (75.8 kg)     Body mass index is 34.75 kg/m².     Cholesterol, Total (mg/dL)   Date Value   09/01/2023 203 (H)   08/05/2022 184   05/05/2021 225 (H)     CHOLESTEROL, TOTAL (mg/dL)   Date Value   03/17/2014 271 (H)     HDL Cholesterol (mg/dL)   Date Value   09/01/2023 80 (H)   08/05/2022 66 (H)   05/05/2021 67 (H)     HDL CHOLESTEROL (mg/dL)   Date Value   03/17/2014 117     LDL Cholesterol (mg/dL)   Date Value   09/01/2023 95   08/05/2022 104 (H)   05/05/2021 124 (H)     LDL-CHOLESTEROL (mg/dL (calc))   Date Value   03/17/2014 139 (H)     AST (U/L)   Date Value   04/11/2024 10 (L)   09/01/2023 15   08/05/2022 11 (L)   06/30/2014 16   03/17/2014 17     ALT (U/L)   Date Value   04/11/2024 24   09/01/2023 25   08/05/2022 24   06/30/2014 26   03/17/2014 14        Current Outpatient Medications   Medication Sig Dispense Refill    ferrous sulfate 325 (65 FE) MG Oral Tab EC Take 1 tablet (325 mg total) by mouth daily with breakfast.      buPROPion  MG Oral Tablet 24 Hr Take 1 tablet (300 mg total) by mouth daily. 90 tablet 0    Rizatriptan Benzoate 10 MG Oral Tab use at onset; may repeat once after 2 hours- ONLY 2 IN 24 HOUR PERIOD MAX.   This is a 30 day supply. 12 tablet 0    FLUOXETINE 20 MG Oral Cap TAKE 2 CAPSULES(40 MG) BY MOUTH DAILY 60 capsule 0    levothyroxine 75 MCG Oral Tab TAKE 1 TABLET(75 MCG) BY MOUTH BEFORE BREAKFAST 90 tablet 0    PROPRANOLOL HCL ER 60 MG Oral Capsule SR 24 Hr TAKE 1 CAPSULE(60 MG) BY MOUTH DAILY 30 capsule 5    Magnesium 100 MG Oral Tab Take 1 tablet (100 mg total) by mouth at bedtime.      Cholecalciferol (VITAMIN D3) 25 MCG (1000 UT) Oral Cap Take 1 tablet by mouth daily.        Past Medical History:    Abdominal pain    Anxiety    On and off depending on situation- not constant    Back pain    Belching    Bloating    Body piercing    Chest pain    Constipation    Decorative tattoo    Depression    Diarrhea, unspecified    Fatigue    Flatulence/gas pain/belching    Food intolerance    Headache disorder    Heartburn    Heavy menses    Hemorrhoids    History of depression    Hypothyroidism    Dr Terry found it & currently on medication for it    Indigestion    Loss of appetite    Menses painful    Migraine    MRSA infection    leg    Nausea    Night sweats    Obesity    Pain with bowel movements    Skin blushing/flushing    Sleep disturbance    Stress    Vitamin D deficiency    Vomiting    Wears glasses    Weight gain    Weight loss      Past Surgical History:   Procedure Laterality Date    Colonoscopy  ?    Eating and keeping food down was problematic, lots of nausea    Orif radial shaft fracture Left 2024    Other surgical history  22yo    breast reconstruction d/t congenital anomalyt      Family History   Problem Relation Age of Onset    Diabetes Father         Type 2 Onset - not born with it    Lipids Father         High cholesterol i think    Hypertension Father     Heart Attack Father         3-4 since ;  of heart failure in 2023    Hypertension Mother     Lipids Mother         High cholesterol i think    Heart Disorder Paternal Grandmother         stroke    Stroke Paternal  Grandmother         Got it very late in life but recovered      Social History:   Social History     Socioeconomic History    Marital status: Single   Tobacco Use    Smoking status: Never     Passive exposure: Never    Smokeless tobacco: Never   Vaping Use    Vaping status: Never Used   Substance and Sexual Activity    Alcohol use: Yes     Alcohol/week: 2.0 standard drinks of alcohol     Types: 2 Glasses of wine per week     Comment: Sometimes dont drink at all - it depends on the week    Drug use: No   Other Topics Concern    Caffeine Concern Yes     Comment: Get headaches when don't have any    Exercise Yes    Seat Belt No    Special Diet No    Stress Concern No    Weight Concern Yes     Comment: Gained significant amount of weight in the last 2 years     Social Drivers of Health      Received from [facility], [facility]    Housing Stability        REVIEW OF SYSTEMS:   Review of Systems   Constitutional:  Negative for chills, fatigue, fever and unexpected weight change.   HENT:  Positive for rhinorrhea (randomly, allergies?). Negative for congestion, ear pain, postnasal drip, sore throat and trouble swallowing.    Eyes:  Negative for visual disturbance.   Respiratory:  Negative for cough and shortness of breath.    Cardiovascular:  Negative for chest pain and palpitations.   Gastrointestinal:  Negative for abdominal pain, blood in stool, constipation, diarrhea, nausea and vomiting.        Stools seem softer than usual, has been eating more fast food   Endocrine: Negative for cold intolerance, heat intolerance, polydipsia, polyphagia and polyuria.   Genitourinary:  Negative for dysuria, frequency, hematuria and pelvic pain.   Musculoskeletal:  Positive for joint swelling. Negative for back pain.   Skin:  Negative for rash.   Neurological:  Negative for headaches.   Hematological:  Does not bruise/bleed easily.   Psychiatric/Behavioral:  Positive for dysphoric mood. Negative  for sleep disturbance and suicidal ideas. The patient is nervous/anxious.        EXAM:   /70 (BP Location: Right arm, Patient Position: Sitting, Cuff Size: large)   Pulse 68   Temp 97.8 °F (36.6 °C) (Temporal)   Resp (!) 99   Ht 5' 2\" (1.575 m)   Wt 190 lb (86.2 kg)   LMP 01/30/2025 (Approximate)   BMI 34.75 kg/m²   Ideal body weight: 50.1 kg (110 lb 7.2 oz)  Adjusted ideal body weight: 64.5 kg (142 lb 4.3 oz)   Physical Exam  Constitutional:       General: She is not in acute distress.     Appearance: She is well-developed, well-groomed and overweight. She is not ill-appearing.   HENT:      Right Ear: Tympanic membrane, ear canal and external ear normal.      Left Ear: Tympanic membrane, ear canal and external ear normal.      Nose: Nose normal.      Mouth/Throat:      Mouth: Mucous membranes are moist.      Pharynx: Oropharynx is clear.   Eyes:      Conjunctiva/sclera: Conjunctivae normal.      Pupils: Pupils are equal, round, and reactive to light.   Neck:      Thyroid: No thyromegaly.   Cardiovascular:      Rate and Rhythm: Normal rate and regular rhythm.      Heart sounds: Normal heart sounds.   Pulmonary:      Effort: Pulmonary effort is normal.      Breath sounds: Normal breath sounds.   Abdominal:      General: Bowel sounds are normal.      Palpations: Abdomen is soft.      Tenderness: There is no abdominal tenderness.   Musculoskeletal:      Right wrist: Swelling (mildly) and tenderness present. Normal range of motion.        Arms:       Cervical back: Normal range of motion.   Skin:     General: Skin is warm and dry.   Neurological:      General: No focal deficit present.      Mental Status: She is alert and oriented to person, place, and time.      Gait: Gait normal.   Psychiatric:         Mood and Affect: Mood normal.         ASSESSMENT AND PLAN:   Diagnoses and all orders for this visit:    General medical exam  -     Hemoglobin A1C; Future  -     TSH W Reflex To Free T4; Future  -     Comp  Metabolic Panel (14); Future  -     Lipid Panel; Future  -     CBC With Differential With Platelet; Future    Encounter for screening mammogram for malignant neoplasm of breast  -     [facility] VARGAS 2D+3D SCREENING BILAT (CPT=77067/28305); Future    Need for vaccination  -     Fluzone trivalent vaccine, PF 0.5mL, 6mo+ (30253)    Menorrhagia with regular cycle  Comments:  has not tolerated hormonal meds in past, check lab work  Orders:  -     CBC With Differential With Platelet; Future  -     Ferritin [E]; Future  -     Iron And Tibc [E]; Future    Iron deficiency anemia due to chronic blood loss  Comments:  taking daily iron but vitamin C made her nauseous, check lab work today  Orders:  -     CBC With Differential With Platelet; Future  -     Ferritin [E]; Future  -     Iron And Tibc [E]; Future    PCOS (polycystic ovarian syndrome)    Mood swings  -     [facility] Referral - In Network  -     Vitamin D [E]; Future  -     Vitamin B12 [E]; Future    Hypothyroidism due to Hashimoto's thyroiditis  Comments:  check lab work today    Essential tremor  Comments:  significant improvement with Propranolol    Chronic migraine without aura without status migrainosus, not intractable  Comments:  well controlled with propranolol and PRN Rizatriptan. Follows with neurology  Orders:  -     Rizatriptan Benzoate 10 MG Oral Tab; use at onset; may repeat once after 2 hours- ONLY 2 IN 24 HOUR PERIOD MAX.  This is a 30 day supply.    Adjustment reaction with anxiety and depression  Comments:  on highest dose of Bupropion & Fluoxetine, recommend counseling. she is agreeable  Orders:  -     [facility] Referral - In Network  -     buPROPion  MG Oral Tablet 24 Hr; Take 1 tablet (300 mg total) by mouth daily.  -     Vitamin D [E]; Future  -     Vitamin B12 [E]; Future    Vitamin D deficiency  Comments:  taking OTC Vitamin D, check level today  Orders:  -     Vitamin D [E]; Future    Tension headache    Other rhinitis  Comments:  check allergy  panel  Orders:  -     Allergens, Zone 8 [E]; Future    Right wrist pain  Comments:  continue supportive care, check xray today  Orders:  -     XR WRIST COMPLETE (MIN 3 VIEWS), RIGHT (CPT=73110); Future    Injury of coccyx, initial encounter  Comments:  check xray today  Orders:  -     XR SACRUM + COCCYX (MIN 2 VIEWS) (CPT=72220); Future

## 2025-02-01 NOTE — TELEPHONE ENCOUNTER
1/31/2025  2:22 PM MIRIAM Garcia RN Patient Medical Advice Request  test results     [facility]

## 2025-02-04 ENCOUNTER — TELEPHONE (OUTPATIENT)
Dept: FAMILY MEDICINE CLINIC | Facility: CLINIC | Age: 40
End: 2025-02-04

## 2025-02-04 LAB

## 2025-02-04 NOTE — TELEPHONE ENCOUNTER
2/4/2025  8:35 AM Y Ariadne Gee RN Patient Medical Advice Request  lab resul     [facility] message viewed by patient

## 2025-02-04 NOTE — TELEPHONE ENCOUNTER
----- Message from Tierney Freeman sent at 2/4/2025  8:14 AM CST -----  No environmental allergies noted

## 2025-02-25 NOTE — TELEPHONE ENCOUNTER
Fluoxetine last refilled 1/29/25  No future appointment  in office  LOV with Cindy 1/31/25      Psychiatric Non-Scheduled (Anti-Anxiety) [id number]/2025 11:43 AM   Protocol Details In person appointment or virtual visit in the past 6 mos or appointment in next 3 mos    Depression Screening completed within the past 12 months    Medication is active on med list

## 2025-03-07 ENCOUNTER — HOSPITAL ENCOUNTER (OUTPATIENT)
Dept: MAMMOGRAPHY | Age: 40
Discharge: HOME | End: 2025-03-07
Attending: NURSE PRACTITIONER
Payer: COMMERCIAL

## 2025-03-07 DIAGNOSIS — Z12.31 ENCOUNTER FOR SCREENING MAMMOGRAM FOR MALIGNANT NEOPLASM OF BREAST: ICD-10-CM

## 2025-03-07 PROCEDURE — 77063 BREAST TOMOSYNTHESIS BI: CPT | Performed by: NURSE PRACTITIONER

## 2025-03-07 PROCEDURE — 77067 SCR MAMMO BI INCL CAD: CPT | Performed by: NURSE PRACTITIONER

## 2025-04-14 ENCOUNTER — LAB ENCOUNTER (OUTPATIENT)
Dept: LAB | Age: 40
End: 2025-04-14
Attending: NURSE PRACTITIONER
Payer: COMMERCIAL

## 2025-04-14 ENCOUNTER — OFFICE VISIT (OUTPATIENT)
Dept: FAMILY MEDICINE CLINIC | Facility: CLINIC | Age: 40
End: 2025-04-14
Payer: COMMERCIAL

## 2025-04-14 ENCOUNTER — HOSPITAL ENCOUNTER (OUTPATIENT)
Dept: ULTRASOUND IMAGING | Facility: HOSPITAL | Age: 40
Discharge: HOME | End: 2025-04-14
Attending: NURSE PRACTITIONER
Payer: COMMERCIAL

## 2025-04-14 ENCOUNTER — TELEPHONE (OUTPATIENT)
Dept: FAMILY MEDICINE CLINIC | Facility: CLINIC | Age: 40
End: 2025-04-14

## 2025-04-14 VITALS
BODY MASS INDEX: 33.86 KG/M2 | DIASTOLIC BLOOD PRESSURE: 62 MMHG | OXYGEN SATURATION: 96 % | WEIGHT: 184 LBS | HEART RATE: 71 BPM | TEMPERATURE: 98 F | SYSTOLIC BLOOD PRESSURE: 108 MMHG | HEIGHT: 62 IN

## 2025-04-14 DIAGNOSIS — D50.0 IRON DEFICIENCY ANEMIA DUE TO CHRONIC BLOOD LOSS: ICD-10-CM

## 2025-04-14 DIAGNOSIS — R10.84 GENERALIZED ABDOMINAL PAIN: Primary | ICD-10-CM

## 2025-04-14 DIAGNOSIS — M54.50 ACUTE RIGHT-SIDED LOW BACK PAIN WITHOUT SCIATICA: ICD-10-CM

## 2025-04-14 DIAGNOSIS — R10.11 RUQ PAIN: ICD-10-CM

## 2025-04-14 DIAGNOSIS — R10.84 GENERALIZED ABDOMINAL PAIN: ICD-10-CM

## 2025-04-14 DIAGNOSIS — E78.5 ELEVATED LIPIDS: ICD-10-CM

## 2025-04-14 DIAGNOSIS — K80.20 CALCULUS OF GALLBLADDER WITHOUT CHOLECYSTITIS WITHOUT OBSTRUCTION: ICD-10-CM

## 2025-04-14 LAB
ALBUMIN SERPL-MCNC: 4.8 G/DL (ref 3.2–4.8)
ALBUMIN/GLOB SERPL: 1.7 {RATIO} (ref 1–2)
ALP LIVER SERPL-CCNC: 92 U/L (ref 37–98)
ALT SERPL-CCNC: 618 U/L (ref 10–49)
AMYLASE SERPL-CCNC: 34 U/L (ref 30–118)
ANION GAP SERPL CALC-SCNC: 8 MMOL/L (ref 0–18)
AST SERPL-CCNC: 154 U/L (ref ?–34)
BASOPHILS # BLD AUTO: 0.04 X10(3) UL (ref 0–0.2)
BASOPHILS NFR BLD AUTO: 0.7 %
BILIRUB SERPL-MCNC: 1.1 MG/DL (ref 0.3–1.2)
BUN BLD-MCNC: 11 MG/DL (ref 9–23)
CALCIUM BLD-MCNC: 10.2 MG/DL (ref 8.7–10.6)
CHLORIDE SERPL-SCNC: 103 MMOL/L (ref 98–112)
CHOLEST SERPL-MCNC: 252 MG/DL (ref ?–200)
CO2 SERPL-SCNC: 25 MMOL/L (ref 21–32)
CREAT BLD-MCNC: 1.05 MG/DL (ref 0.55–1.02)
DEPRECATED HBV CORE AB SER IA-ACNC: 123 NG/ML (ref 50–306)
EGFRCR SERPLBLD CKD-EPI 2021: 69 ML/MIN/1.73M2 (ref 60–?)
EOSINOPHIL # BLD AUTO: 0.14 X10(3) UL (ref 0–0.7)
EOSINOPHIL NFR BLD AUTO: 2.6 %
ERYTHROCYTE [DISTWIDTH] IN BLOOD BY AUTOMATED COUNT: 12.1 %
FASTING PATIENT LIPID ANSWER: YES
FASTING STATUS PATIENT QL REPORTED: YES
GLOBULIN PLAS-MCNC: 2.9 G/DL (ref 2–3.5)
GLUCOSE BLD-MCNC: 97 MG/DL (ref 70–99)
HCT VFR BLD AUTO: 46.6 % (ref 35–48)
HDLC SERPL-MCNC: 67 MG/DL (ref 40–59)
HGB BLD-MCNC: 15.7 G/DL (ref 12–16)
IMM GRANULOCYTES # BLD AUTO: 0.02 X10(3) UL (ref 0–1)
IMM GRANULOCYTES NFR BLD: 0.4 %
LDLC SERPL CALC-MCNC: 150 MG/DL (ref ?–100)
LIPASE SERPL-CCNC: 48 U/L (ref 12–53)
LYMPHOCYTES # BLD AUTO: 1.18 X10(3) UL (ref 1–4)
LYMPHOCYTES NFR BLD AUTO: 22 %
MCH RBC QN AUTO: 31.3 PG (ref 26–34)
MCHC RBC AUTO-ENTMCNC: 33.7 G/DL (ref 31–37)
MCV RBC AUTO: 92.8 FL (ref 80–100)
MONOCYTES # BLD AUTO: 0.49 X10(3) UL (ref 0.1–1)
MONOCYTES NFR BLD AUTO: 9.1 %
NEUTROPHILS # BLD AUTO: 3.49 X10 (3) UL (ref 1.5–7.7)
NEUTROPHILS # BLD AUTO: 3.49 X10(3) UL (ref 1.5–7.7)
NEUTROPHILS NFR BLD AUTO: 65.2 %
NONHDLC SERPL-MCNC: 185 MG/DL (ref ?–130)
OSMOLALITY SERPL CALC.SUM OF ELEC: 281 MOSM/KG (ref 275–295)
PLATELET # BLD AUTO: 330 10(3)UL (ref 150–450)
POTASSIUM SERPL-SCNC: 4 MMOL/L (ref 3.5–5.1)
PROT SERPL-MCNC: 7.7 G/DL (ref 5.7–8.2)
RBC # BLD AUTO: 5.02 X10(6)UL (ref 3.8–5.3)
SODIUM SERPL-SCNC: 136 MMOL/L (ref 136–145)
TRIGL SERPL-MCNC: 194 MG/DL (ref 30–149)
VLDLC SERPL CALC-MCNC: 37 MG/DL (ref 0–30)
WBC # BLD AUTO: 5.4 X10(3) UL (ref 4–11)

## 2025-04-14 PROCEDURE — 83690 ASSAY OF LIPASE: CPT | Performed by: NURSE PRACTITIONER

## 2025-04-14 PROCEDURE — 80053 COMPREHEN METABOLIC PANEL: CPT | Performed by: NURSE PRACTITIONER

## 2025-04-14 PROCEDURE — 76700 US EXAM ABDOM COMPLETE: CPT | Performed by: NURSE PRACTITIONER

## 2025-04-14 PROCEDURE — 82150 ASSAY OF AMYLASE: CPT | Performed by: NURSE PRACTITIONER

## 2025-04-14 PROCEDURE — 80061 LIPID PANEL: CPT | Performed by: NURSE PRACTITIONER

## 2025-04-14 PROCEDURE — 82728 ASSAY OF FERRITIN: CPT | Performed by: NURSE PRACTITIONER

## 2025-04-14 PROCEDURE — 85025 COMPLETE CBC W/AUTO DIFF WBC: CPT | Performed by: NURSE PRACTITIONER

## 2025-04-14 NOTE — TELEPHONE ENCOUNTER
Patient scheduled appointment on mychart for Major Stomach pains that started on 4/9 while driving home and been having pains on/off since     Ok to keep appointment?     sending to triage     Future Appointments   Date Time Provider Department Center   4/14/2025 10:00 AM Tierney Freeman APRN [facility] [place]

## 2025-04-14 NOTE — PROGRESS NOTES
HPI:   Stomach Pain  This is a new (has happened in the past) problem. Episode onset: 4/9/2025. The onset quality is sudden (while driving home). The problem has been waxing and waning (subsided some once she got home. Pain coming and going, did wake her up overnight). Pain location: mid abdomen. Quality: stabbing, squeezing. The abdominal pain radiates to the RUQ. Associated symptoms include diarrhea (from Epsom salt), nausea and vomiting (Friday into Saturday after eating [employer]). Pertinent negatives include no fever, frequency or hematuria. The pain is aggravated by movement and eating. Relieved by: sleeping/rest/not moving. Treatments tried: Epsom salts to have diarrhea and \"clean out\" The treatment provided mild relief.   Back Pain  This is a new problem. Episode onset: overnight Friday. The problem has been resolved since onset. The pain is present in the thoracic spine (on right side). Associated symptoms include abdominal pain. Pertinent negatives include no chest pain or fever. Treatments tried: Tylenol. Improvement on treatment: felt better after vomiting.        Current Medications[1]   Past Medical History[2]   Past Surgical History[3]   Family History[4]   Short Social Hx on File[5]      REVIEW OF SYSTEMS:   Review of Systems   Constitutional:  Positive for appetite change (decreased) and fatigue. Negative for chills and fever.   Respiratory:  Negative for cough and shortness of breath.    Cardiovascular:  Negative for chest pain.   Gastrointestinal:  Positive for abdominal pain, diarrhea (from Epsom salt), nausea and vomiting (Friday into Saturday after eating [employer]). Negative for abdominal distention and blood in stool.   Genitourinary:  Negative for frequency and hematuria.   Musculoskeletal:  Positive for back pain.       EXAM:   /62   Pulse 71   Temp 97.9 °F (36.6 °C)   Ht 5' 2\" (1.575 m)   Wt 184 lb (83.5 kg)   LMP 03/19/2025 (Approximate)   SpO2 96%   BMI 33.65 kg/m²    Physical Exam  Constitutional:       General: She is not in acute distress.     Appearance: She is obese.   Cardiovascular:      Rate and Rhythm: Normal rate and regular rhythm.      Heart sounds: Normal heart sounds.   Pulmonary:      Effort: Pulmonary effort is normal.      Breath sounds: Normal breath sounds.   Abdominal:      General: Bowel sounds are normal. There is no distension.      Tenderness: There is abdominal tenderness (mildly) in the right upper quadrant and epigastric area. There is no right CVA tenderness, left CVA tenderness, guarding or rebound.   Neurological:      Mental Status: She is alert.         ASSESSMENT AND PLAN:   Diagnoses and all orders for this visit:    Generalized abdominal pain  -     CBC W Differential W Platelet [E]; Future  -     Comp Metabolic Panel (14) [E]; Future  -     Lipase [E]; Future  -     Amylase [E]; Future  -     US ABDOMEN COMPLETE (CPT=76700); Future    RUQ pain  -     CBC W Differential W Platelet [E]; Future  -     Comp Metabolic Panel (14) [E]; Future  -     Lipase [E]; Future  -     Amylase [E]; Future  -     US ABDOMEN COMPLETE (CPT=76700); Future    Acute right-sided low back pain without sciatica  -     US ABDOMEN COMPLETE (CPT=76700); Future    Check lab work and ultrasound today  Concern for gallbladder disease  Also recommend starting twice daily Pepcid and bland diet    Note to patient: The 21st Century Cures Act makes medical notes like these available to patients in the interest of transparency. However, be advised this is a medical document. It is intended as peer to peer communication. It is written in medical language and may contain abbreviations or verbiage that are unfamiliar. It may appear blunt or direct. Medical documents are intended to carry relevant information, facts as evident, and the clinical opinion of the practitioner.           [1]   Current Outpatient Medications   Medication Sig Dispense Refill    FLUoxetine 20 MG Oral Cap Take  2 capsules (40 mg total) by mouth daily. 180 capsule 3    ferrous sulfate 325 (65 FE) MG Oral Tab EC Take 1 tablet (325 mg total) by mouth daily with breakfast.      buPROPion  MG Oral Tablet 24 Hr Take 1 tablet (300 mg total) by mouth daily. 90 tablet 0    Rizatriptan Benzoate 10 MG Oral Tab use at onset; may repeat once after 2 hours- ONLY 2 IN 24 HOUR PERIOD MAX.  This is a 30 day supply. 12 tablet 0    levothyroxine 75 MCG Oral Tab TAKE 1 TABLET(75 MCG) BY MOUTH BEFORE BREAKFAST 90 tablet 0    PROPRANOLOL HCL ER 60 MG Oral Capsule SR 24 Hr TAKE 1 CAPSULE(60 MG) BY MOUTH DAILY 30 capsule 5    Magnesium 100 MG Oral Tab Take 1 tablet (100 mg total) by mouth at bedtime.      Cholecalciferol (VITAMIN D3) 25 MCG (1000 UT) Oral Cap Take 1 tablet by mouth daily.     [2]   Past Medical History:   Abdominal pain    Anxiety    On and off depending on situation- not constant    Back pain    Belching    Bloating    Body piercing    Chest pain    Constipation    Decorative tattoo    Depression    Diarrhea, unspecified    Fatigue    Flatulence/gas pain/belching    Food intolerance    Headache disorder    Heartburn    Heavy menses    Hemorrhoids    History of depression    Hypothyroidism    Dr Terry found it & currently on medication for it    Indigestion    Loss of appetite    Menses painful    Migraine    MRSA infection    leg    Nausea    Night sweats    Obesity    Pain with bowel movements    Skin blushing/flushing    Sleep disturbance    Stress    Vitamin D deficiency    Vomiting    Wears glasses    Weight gain    Weight loss   [3]   Past Surgical History:  Procedure Laterality Date    Breast reconstruction      RT reduction, LT implant    Colonoscopy  2007?    Eating and keeping food down was problematic, lots of nausea    Orif radial shaft fracture Left 01/17/2024    Other surgical history  20yo    breast reconstruction d/t congenital anomalyt    Reduction right     [4]   Family History  Problem Relation Age of  Onset    Diabetes Father         Type 2 Onset - not born with it    Lipids Father         High cholesterol i think    Hypertension Father     Heart Attack Father         3-4 since ;  of heart failure in 2023    Hypertension Mother     Lipids Mother         High cholesterol i think    Heart Disorder Paternal Grandmother         stroke    Stroke Paternal Grandmother         Got it very late in life but recovered   [5]   Social History  Socioeconomic History    Marital status:    Tobacco Use    Smoking status: Never     Passive exposure: Never    Smokeless tobacco: Never   Vaping Use    Vaping status: Never Used   Substance and Sexual Activity    Alcohol use: Yes     Alcohol/week: 2.0 standard drinks of alcohol     Types: 2 Glasses of wine per week     Comment: Sometimes dont drink at all - it depends on the week    Drug use: No   Other Topics Concern    Caffeine Concern Yes     Comment: Get headaches when don't have any    Exercise Yes    Seat Belt No    Special Diet No    Stress Concern No    Weight Concern Yes     Comment: Gained significant amount of weight in the last 2 years     Social Drivers of Health      Received from [facility]    Housing Stability

## 2025-04-14 NOTE — TELEPHONE ENCOUNTER
Call from patient  Appointment today at 10am  Driving home April 9 and had epigastric stabbing pain  Didn't radiate  No nauesa, vomiting, diarrhea or constipation that day  Friday/Saturday vomited twice  Had some right sided back pain under rib cage on righ side  After vomited this pain went away  Still has had intermittent dull epigastric ache  Not eating much, trying to bland foods  Drinking fluids  No fever  Still has gallbladder and appendix  No pain right now  Had bright yellow urine yesterday with no urinary symptoms   Advised if any other questions will call back otherwise will see at 10am  Verbalized understanding    Future Appointments   Date Time Provider Department Center   4/14/2025 10:00 AM Tierney Freeman APRN [facility] [place]

## 2025-04-14 NOTE — TELEPHONE ENCOUNTER
Last office visit with 1/31/2025 with CORKY Powell for general medical exam    Message left on patient's cell number to call back to discuss symptoms.    Wikirin chart message also sent.

## 2025-04-15 ENCOUNTER — TELEPHONE (OUTPATIENT)
Dept: FAMILY MEDICINE CLINIC | Facility: CLINIC | Age: 40
End: 2025-04-15

## 2025-04-15 ENCOUNTER — TELEPHONE (OUTPATIENT)
Facility: LOCATION | Age: 40
End: 2025-04-15

## 2025-04-15 NOTE — TELEPHONE ENCOUNTER
Future Appointments   Date Time Provider Department Center   4/16/2025  1:00 PM Carmen Cm MD [id number]

## 2025-04-15 NOTE — TELEPHONE ENCOUNTER
Spoke with Dr. Rogers office - first available appointment is May 5, however will send message to his nurse for sooner appointment. Their office will contact patient with change of appointment.    Attempted to call patient - message left on cell voice mail that surgeon's office may call her with an earlier appointment time.  To go to ER if pain returns    Future Appointments   Date Time Provider Department Center   5/5/2025  2:15 PM Carmen Cm MD [id number]

## 2025-04-15 NOTE — TELEPHONE ENCOUNTER
Patient was seen in Dr. Freeman's office yesterday. The nurse called and asked if the patient can be seen soon. They are in significant pain. I scheduled patient for the first available 05/05. She is hoping this patient can be seen this week.     Please advise   CHIP# [phone]

## 2025-04-15 NOTE — TELEPHONE ENCOUNTER
----- Message from Tierney Freeman sent at 4/15/2025  5:06 AM CDT -----  Cholesterol levels elevated but not going to start med right now with gallbladder issue. Let’s recheck in 6 weeks   Liver enzymes are very high. Please call surgery office and see if they can see patient today. If RUQ pain worse, go to main ER  ----- Message -----  From: Lab, Background User  Sent: 4/14/2025   4:37 PM CDT  To: CORKY Arango

## 2025-04-16 ENCOUNTER — TELEPHONE (OUTPATIENT)
Facility: LOCATION | Age: 40
End: 2025-04-16

## 2025-04-16 ENCOUNTER — PATIENT MESSAGE (OUTPATIENT)
Dept: FAMILY MEDICINE CLINIC | Facility: CLINIC | Age: 40
End: 2025-04-16

## 2025-04-16 ENCOUNTER — OFFICE VISIT (OUTPATIENT)
Facility: LOCATION | Age: 40
End: 2025-04-16
Payer: COMMERCIAL

## 2025-04-16 VITALS
SYSTOLIC BLOOD PRESSURE: 114 MMHG | HEART RATE: 64 BPM | OXYGEN SATURATION: 99 % | TEMPERATURE: 98 F | DIASTOLIC BLOOD PRESSURE: 76 MMHG

## 2025-04-16 DIAGNOSIS — K80.20 CALCULUS OF GALLBLADDER WITHOUT CHOLECYSTITIS WITHOUT OBSTRUCTION: Primary | ICD-10-CM

## 2025-04-16 PROCEDURE — 99205 OFFICE O/P NEW HI 60 MIN: CPT | Performed by: STUDENT IN AN ORGANIZED HEALTH CARE EDUCATION/TRAINING PROGRAM

## 2025-04-16 PROCEDURE — 3074F SYST BP LT 130 MM HG: CPT | Performed by: STUDENT IN AN ORGANIZED HEALTH CARE EDUCATION/TRAINING PROGRAM

## 2025-04-16 PROCEDURE — 3078F DIAST BP <80 MM HG: CPT | Performed by: STUDENT IN AN ORGANIZED HEALTH CARE EDUCATION/TRAINING PROGRAM

## 2025-04-16 NOTE — H&P
The following individual(s) verbally consented to be recorded using ambient AI listening technology and understand that they can each withdraw their consent to this listening technology at any point by asking the clinician to turn off or pause the recording:    Patient name: Evelyn Schmitz Jaylon

## 2025-04-16 NOTE — H&P (VIEW-ONLY)
New Patient Visit Note       Active Problems      1. Calculus of gallbladder without cholecystitis without obstruction        Chief Complaint   Chief Complaint   Patient presents with    New Patient     NP - pt has a stone in her gallbladder, US on 4/14, no symptoms.       History of Present Illness   History of Present Illness  Evelyn Iyer is a 39 year old female with gallstones who presents with abdominal pain and digestive issues. She was referred by her primary care doctor for evaluation of a sizeable gallstone and potential gallbladder removal.    She experienced extreme abdominal pain last Wednesday, primarily across the upper abdomen, which persisted until she went to bed and intermittently woke her up during the night. The pain was severe enough to disrupt her sleep and eating habits, as she did not eat much for two days following the onset of pain. Pain radiated to the right upper back.     She experienced nausea and vomiting from Friday into Saturday, with pain radiating to the right side of her back. Vomiting occurred twice, after which the pain subsided. No fever accompanied these symptoms.    She recalls a similar episode of pain a few weeks prior, which resolved within a day, and initially attributed it to dietary causes. On the day of the recent pain episode, she had consumed [place] sprouts and pulled pork for lunch, with pain starting approximately four hours later.    She reports a pattern of diarrhea, noting that even small meals lead to diarrhea, which she has been experiencing since before the recent pain episode. She describes her bowel movements as softer than usual after lunch each day.    Her past medical history includes the use of antidepressants and thyroid medication. No history of abdominal surgery, heart disease, lung problems, or kidney issues. She has not had a colonoscopy in recent years, although one was scheduled and canceled last year due to illness.    No family  history of colon cancer. No blood in stool, dark tarry stools, or constipation. She mentions a history of stomach issues leading to a colonoscopy in her early twenties, which did not reveal a specific cause at the time.          Allergies  Evelyn is allergic to bactrim [sulfamethoxazole w/trimethoprim], benzonatate, and nirmatrelvir-ritonavir.    Past Medical / Surgical / Social / Family History    The past medical and past surgical history have been reviewed by me today.    Past Medical History[1]  Past Surgical History[2]    The family history and social history have been reviewed by me today.    Family History[3]  Social Hx on file[4]   Medications - Current[5]      Review of Systems  The Review of Systems has been reviewed by me during today.  Review of Systems   Constitutional:  Negative for chills, diaphoresis, fatigue and fever.   HENT:  Negative for ear discharge, ear pain and sore throat.    Eyes:  Negative for pain and discharge.   Respiratory:  Negative for cough, chest tightness and shortness of breath.    Cardiovascular:  Negative for chest pain, palpitations and leg swelling.   Gastrointestinal:  Positive for abdominal pain, nausea and vomiting. Negative for abdominal distention, blood in stool, constipation and diarrhea.   Genitourinary:  Negative for dysuria, frequency, hematuria and urgency.   Skin:  Negative for color change, pallor and rash.   Neurological:  Negative for weakness, light-headedness, numbness and headaches.   Hematological:  Negative for adenopathy. Does not bruise/bleed easily.   Psychiatric/Behavioral:  Negative for agitation and confusion.        Physical Findings   /76 (BP Location: Left arm, Patient Position: Sitting, Cuff Size: adult)   Pulse 64   Temp 98 °F (36.7 °C) (Temporal)   LMP 03/19/2025 (Approximate)   SpO2 99%   Physical Exam  Constitutional:       Appearance: Normal appearance.   HENT:      Head: Normocephalic and atraumatic.   Cardiovascular:       Pulses: Normal pulses.   Pulmonary:      Effort: Pulmonary effort is normal.   Abdominal:      General: Abdomen is flat. There is no distension.      Tenderness: There is abdominal tenderness in the right upper quadrant. There is no guarding or rebound.   Skin:     General: Skin is warm.      Capillary Refill: Capillary refill takes less than 2 seconds.   Neurological:      Mental Status: She is alert and oriented to person, place, and time. Mental status is at baseline.             Assessment/Plan  1. Calculus of gallbladder without cholecystitis without obstruction        Evelyn Iyer is a 39 year old female referred by Melissa Dewitt MD for evaluation of cholelithiasis.She has classic symptoms of cholelithiasis. I reviewed her ultrasound which shows a large gallstone within the gallbladder neck.  I discussed with her robotic cholecystectomy in detail. The benefits of surgical intervention to address the underlying pathology were discussed with the patient. The alternatives to surgery including non-operative treatment with symptoms control were discussed with the patient. The risks of surgical intervention were explained to the patient in detail including but not limited to intra-operative or post-operative bleeding, post-operative incision or intra-abdominal infection, incorrect diagnosis, injury to adjacent organs and structures, and bile duct injury requiring possible immediate or delayed reconstruction potentially by a hepatobiliary surgeon, postoperative bile leak,  retained common bile duct stones, the need for post-procedure ERCP as well as the need for further therapeutic diagnostic or surgical intervention. The possibility of conversion to open procedure was also explained to the patient. The patient did voice understanding. Allpertinent questions were answered to the patient's satisfaction after which the patient provided willing and informed consent to proceed with surgery.       No orders of  the defined types were placed in this encounter.      Imaging & Referrals   None    Follow Up  No follow-ups on file.    Carmen Cm MD         [1]   Past Medical History:   Abdominal pain    Anxiety    On and off depending on situation- not constant    Back pain    Belching    Bloating    Body piercing    Chest pain    Constipation    Decorative tattoo    Depression    Diarrhea, unspecified    Fatigue    Flatulence/gas pain/belching    Food intolerance    Headache disorder    Heartburn    Heavy menses    Hemorrhoids    History of depression    Hypothyroidism    Dr Terry found it & currently on medication for it    Indigestion    Loss of appetite    Menses painful    Migraine    Migraines    MRSA infection    leg    Nausea    Night sweats    Obesity    Pain with bowel movements    Skin blushing/flushing    Sleep disturbance    Stress    Tremor    Vitamin D deficiency    Vomiting    Wears glasses    Weight gain    Weight loss   [2]   Past Surgical History:  Procedure Laterality Date    Breast reconstruction      RT reduction, LT implant    Colonoscopy  ?    Eating and keeping food down was problematic, lots of nausea    Orif radial shaft fracture Left 2024    Other surgical history  22yo    breast reconstruction d/t congenital anomalyt    Reduction right     [3]   Family History  Problem Relation Age of Onset    Diabetes Father         Type 2 Onset - not born with it    Lipids Father         High cholesterol i think    Hypertension Father     Heart Attack Father         3-4 since ;  of heart failure in 2023    Heart Disease Father     Hypertension Mother     Lipids Mother         High cholesterol i think    Heart Disorder Paternal Grandmother         stroke    Stroke Paternal Grandmother         Got it very late in life but recovered   [4]   Social History  Socioeconomic History    Marital status:    Tobacco Use    Smoking status: Never     Passive exposure: Never    Smokeless  tobacco: Never   Vaping Use    Vaping status: Never Used   Substance and Sexual Activity    Alcohol use: Yes     Alcohol/week: 2.0 standard drinks of alcohol     Types: 2 Glasses of wine per week     Comment: Sometimes dont drink at all - it depends on the week    Drug use: No   Other Topics Concern    Caffeine Concern Yes     Comment: Get headaches when don't have any    Exercise Yes     Comment: Weekly [employer] class and daily at least 20min walks with dog    Seat Belt Yes    Special Diet Yes     Comment: Started bland diet on 4-13-25 due to stomach pains    Stress Concern Yes    Weight Concern Yes     Comment: Gained significant amount of weight in the last 2 years   [5]   Current Outpatient Medications:     FLUoxetine 20 MG Oral Cap, Take 2 capsules (40 mg total) by mouth daily., Disp: 180 capsule, Rfl: 3    ferrous sulfate 325 (65 FE) MG Oral Tab EC, Take 1 tablet (325 mg total) by mouth daily with breakfast., Disp: , Rfl:     buPROPion  MG Oral Tablet 24 Hr, Take 1 tablet (300 mg total) by mouth daily., Disp: 90 tablet, Rfl: 0    Rizatriptan Benzoate 10 MG Oral Tab, use at onset; may repeat once after 2 hours- ONLY 2 IN 24 HOUR PERIOD MAX.  This is a 30 day supply., Disp: 12 tablet, Rfl: 0    levothyroxine 75 MCG Oral Tab, TAKE 1 TABLET(75 MCG) BY MOUTH BEFORE BREAKFAST, Disp: 90 tablet, Rfl: 0    PROPRANOLOL HCL ER 60 MG Oral Capsule SR 24 Hr, TAKE 1 CAPSULE(60 MG) BY MOUTH DAILY, Disp: 30 capsule, Rfl: 5    Magnesium 100 MG Oral Tab, Take 1 tablet (100 mg total) by mouth at bedtime., Disp: , Rfl:     Cholecalciferol (VITAMIN D3) 25 MCG (1000 UT) Oral Cap, Take 1 tablet by mouth in the morning., Disp: , Rfl:

## 2025-04-16 NOTE — H&P
New Patient Visit Note       Active Problems      1. Calculus of gallbladder without cholecystitis without obstruction        Chief Complaint   Chief Complaint   Patient presents with    New Patient     NP - pt has a stone in her gallbladder, US on 4/14, no symptoms.       History of Present Illness   History of Present Illness  Evelyn Iyer is a 39 year old female with gallstones who presents with abdominal pain and digestive issues. She was referred by her primary care doctor for evaluation of a sizeable gallstone and potential gallbladder removal.    She experienced extreme abdominal pain last Wednesday, primarily across the upper abdomen, which persisted until she went to bed and intermittently woke her up during the night. The pain was severe enough to disrupt her sleep and eating habits, as she did not eat much for two days following the onset of pain. Pain radiated to the right upper back.     She experienced nausea and vomiting from Friday into Saturday, with pain radiating to the right side of her back. Vomiting occurred twice, after which the pain subsided. No fever accompanied these symptoms.    She recalls a similar episode of pain a few weeks prior, which resolved within a day, and initially attributed it to dietary causes. On the day of the recent pain episode, she had consumed [place] sprouts and pulled pork for lunch, with pain starting approximately four hours later.    She reports a pattern of diarrhea, noting that even small meals lead to diarrhea, which she has been experiencing since before the recent pain episode. She describes her bowel movements as softer than usual after lunch each day.    Her past medical history includes the use of antidepressants and thyroid medication. No history of abdominal surgery, heart disease, lung problems, or kidney issues. She has not had a colonoscopy in recent years, although one was scheduled and canceled last year due to illness.    No family  history of colon cancer. No blood in stool, dark tarry stools, or constipation. She mentions a history of stomach issues leading to a colonoscopy in her early twenties, which did not reveal a specific cause at the time.          Allergies  Evelyn is allergic to bactrim [sulfamethoxazole w/trimethoprim], benzonatate, and nirmatrelvir-ritonavir.    Past Medical / Surgical / Social / Family History    The past medical and past surgical history have been reviewed by me today.    Past Medical History[1]  Past Surgical History[2]    The family history and social history have been reviewed by me today.    Family History[3]  Social Hx on file[4]   Medications - Current[5]      Review of Systems  The Review of Systems has been reviewed by me during today.  Review of Systems   Constitutional:  Negative for chills, diaphoresis, fatigue and fever.   HENT:  Negative for ear discharge, ear pain and sore throat.    Eyes:  Negative for pain and discharge.   Respiratory:  Negative for cough, chest tightness and shortness of breath.    Cardiovascular:  Negative for chest pain, palpitations and leg swelling.   Gastrointestinal:  Positive for abdominal pain, nausea and vomiting. Negative for abdominal distention, blood in stool, constipation and diarrhea.   Genitourinary:  Negative for dysuria, frequency, hematuria and urgency.   Skin:  Negative for color change, pallor and rash.   Neurological:  Negative for weakness, light-headedness, numbness and headaches.   Hematological:  Negative for adenopathy. Does not bruise/bleed easily.   Psychiatric/Behavioral:  Negative for agitation and confusion.        Physical Findings   /76 (BP Location: Left arm, Patient Position: Sitting, Cuff Size: adult)   Pulse 64   Temp 98 °F (36.7 °C) (Temporal)   LMP 03/19/2025 (Approximate)   SpO2 99%   Physical Exam  Constitutional:       Appearance: Normal appearance.   HENT:      Head: Normocephalic and atraumatic.   Cardiovascular:       Pulses: Normal pulses.   Pulmonary:      Effort: Pulmonary effort is normal.   Abdominal:      General: Abdomen is flat. There is no distension.      Tenderness: There is abdominal tenderness in the right upper quadrant. There is no guarding or rebound.   Skin:     General: Skin is warm.      Capillary Refill: Capillary refill takes less than 2 seconds.   Neurological:      Mental Status: She is alert and oriented to person, place, and time. Mental status is at baseline.             Assessment/Plan  1. Calculus of gallbladder without cholecystitis without obstruction        Evelyn Iyer is a 39 year old female referred by Melissa Dewitt MD for evaluation of cholelithiasis.She has classic symptoms of cholelithiasis. I reviewed her ultrasound which shows a large gallstone within the gallbladder neck.  I discussed with her robotic cholecystectomy in detail. The benefits of surgical intervention to address the underlying pathology were discussed with the patient. The alternatives to surgery including non-operative treatment with symptoms control were discussed with the patient. The risks of surgical intervention were explained to the patient in detail including but not limited to intra-operative or post-operative bleeding, post-operative incision or intra-abdominal infection, incorrect diagnosis, injury to adjacent organs and structures, and bile duct injury requiring possible immediate or delayed reconstruction potentially by a hepatobiliary surgeon, postoperative bile leak,  retained common bile duct stones, the need for post-procedure ERCP as well as the need for further therapeutic diagnostic or surgical intervention. The possibility of conversion to open procedure was also explained to the patient. The patient did voice understanding. Allpertinent questions were answered to the patient's satisfaction after which the patient provided willing and informed consent to proceed with surgery.       No orders of  the defined types were placed in this encounter.      Imaging & Referrals   None    Follow Up  No follow-ups on file.    Carmen Cm MD         [1]   Past Medical History:   Abdominal pain    Anxiety    On and off depending on situation- not constant    Back pain    Belching    Bloating    Body piercing    Chest pain    Constipation    Decorative tattoo    Depression    Diarrhea, unspecified    Fatigue    Flatulence/gas pain/belching    Food intolerance    Headache disorder    Heartburn    Heavy menses    Hemorrhoids    History of depression    Hypothyroidism    Dr Terry found it & currently on medication for it    Indigestion    Loss of appetite    Menses painful    Migraine    Migraines    MRSA infection    leg    Nausea    Night sweats    Obesity    Pain with bowel movements    Skin blushing/flushing    Sleep disturbance    Stress    Tremor    Vitamin D deficiency    Vomiting    Wears glasses    Weight gain    Weight loss   [2]   Past Surgical History:  Procedure Laterality Date    Breast reconstruction      RT reduction, LT implant    Colonoscopy  ?    Eating and keeping food down was problematic, lots of nausea    Orif radial shaft fracture Left 2024    Other surgical history  20yo    breast reconstruction d/t congenital anomalyt    Reduction right     [3]   Family History  Problem Relation Age of Onset    Diabetes Father         Type 2 Onset - not born with it    Lipids Father         High cholesterol i think    Hypertension Father     Heart Attack Father         3-4 since ;  of heart failure in 2023    Heart Disease Father     Hypertension Mother     Lipids Mother         High cholesterol i think    Heart Disorder Paternal Grandmother         stroke    Stroke Paternal Grandmother         Got it very late in life but recovered   [4]   Social History  Socioeconomic History    Marital status:    Tobacco Use    Smoking status: Never     Passive exposure: Never    Smokeless  tobacco: Never   Vaping Use    Vaping status: Never Used   Substance and Sexual Activity    Alcohol use: Yes     Alcohol/week: 2.0 standard drinks of alcohol     Types: 2 Glasses of wine per week     Comment: Sometimes dont drink at all - it depends on the week    Drug use: No   Other Topics Concern    Caffeine Concern Yes     Comment: Get headaches when don't have any    Exercise Yes     Comment: Weekly [place] class and daily at least 20min walks with dog    Seat Belt Yes    Special Diet Yes     Comment: Started bland diet on 4-13-25 due to stomach pains    Stress Concern Yes    Weight Concern Yes     Comment: Gained significant amount of weight in the last 2 years   [5]   Current Outpatient Medications:     FLUoxetine 20 MG Oral Cap, Take 2 capsules (40 mg total) by mouth daily., Disp: 180 capsule, Rfl: 3    ferrous sulfate 325 (65 FE) MG Oral Tab EC, Take 1 tablet (325 mg total) by mouth daily with breakfast., Disp: , Rfl:     buPROPion  MG Oral Tablet 24 Hr, Take 1 tablet (300 mg total) by mouth daily., Disp: 90 tablet, Rfl: 0    Rizatriptan Benzoate 10 MG Oral Tab, use at onset; may repeat once after 2 hours- ONLY 2 IN 24 HOUR PERIOD MAX.  This is a 30 day supply., Disp: 12 tablet, Rfl: 0    levothyroxine 75 MCG Oral Tab, TAKE 1 TABLET(75 MCG) BY MOUTH BEFORE BREAKFAST, Disp: 90 tablet, Rfl: 0    PROPRANOLOL HCL ER 60 MG Oral Capsule SR 24 Hr, TAKE 1 CAPSULE(60 MG) BY MOUTH DAILY, Disp: 30 capsule, Rfl: 5    Magnesium 100 MG Oral Tab, Take 1 tablet (100 mg total) by mouth at bedtime., Disp: , Rfl:     Cholecalciferol (VITAMIN D3) 25 MCG (1000 UT) Oral Cap, Take 1 tablet by mouth in the morning., Disp: , Rfl:

## 2025-04-17 RX ORDER — LEVOTHYROXINE SODIUM 75 UG/1
75 TABLET ORAL
Qty: 90 TABLET | Refills: 1 | Status: SHIPPED | OUTPATIENT
Start: 2025-04-17

## 2025-04-17 NOTE — TELEPHONE ENCOUNTER
Thyroid Medication Protocol Passed04/17/2025 03:09 PM   Protocol Details TSH in past 12 months    Last TSH value is normal    In person appointment or virtual visit in the past 12 mos or appointment in next 3 mos    Medication is active on med list

## 2025-04-17 NOTE — TELEPHONE ENCOUNTER
Cholesterol levels elevated but not going to start med right now with gallbladder issue. Let’s recheck in 6 weeks   Liver enzymes are very high. Please call surgery office and see if they can see patient today. If RUQ pain worse, go to main ER  ----- Message -----  From: Lab, Background User  Sent: 4/14/2025   4:37 PM CDT  To: CORKY Arango Lisa, RN  LP    4/15/25  8:38 AM  Note     Spoke with Dr. Rogers office - first available appointment is May 5, however will send message to his nurse for sooner appointment. Their office will contact patient with change of appointment.     Attempted to call patient - message left on cell voice mail that surgeon's office may call her with an earlier appointment time.  To go to ER if pain returns            Future Appointments   Date Time Provider Department Center   5/5/2025  2:15 PM Carmen Cm MD [id number]

## 2025-04-18 ENCOUNTER — TELEPHONE (OUTPATIENT)
Facility: LOCATION | Age: 40
End: 2025-04-18

## 2025-04-18 NOTE — TELEPHONE ENCOUNTER
No prior authorization required for cpt code 85626. Ref #: [id number]. Spoke with Edwin @ 9:09am on 4/18   Not applicable

## 2025-04-30 ENCOUNTER — TELEPHONE (OUTPATIENT)
Dept: FAMILY MEDICINE CLINIC | Facility: CLINIC | Age: 40
End: 2025-04-30

## 2025-05-01 NOTE — DISCHARGE INSTRUCTIONS
Home Care Instructions  Cholecystectomy  Carmen Cm MD       WHAT TO EXPECT  You may feel pain at the incisions. This is due to stitches placed during the surgery.    You may feel pain in the shoulders. This is due to irritation of the diaphragm by the air used to inflate the abdomen.    You may feel a sore throat. This is due to the breathing tube used during surgery.     You may feel mild nausea and vomiting for the first 24 hours, this should resolve quickly.    You may have constipation, especially if taking narcotic pain medications. If you have not had a bowel movement by 48 hours after surgery, take Miralax 17g (one cap full) every 12 hours until you have a bowel movement. If another 24 hours goes by without a bowel movement, then take a dose of magnesium citrate or milk of magnesia.     MEDICATIONS  Take 2 Extra Strength Tylenol (1000mg every) 8 hours for pain. For the first 3 days it is best to take the Tylenol every 8 hours even if you do not feel much pain.     For moderate to severe pain take one Oxycodone pill (5mg) or Norco (325/5mg) every six hours as needed for pain. If you do not feel that narcotics are necessary you shouldn’t take them. If the pain is severe you can take two pills (10mg) every six hours.    You can take Advil (ibuprofen) 800mg every 8 hours or Alleve (naproxen) 500mg every 12 hours. You can start this after 8:45 pm.     Please ask your surgeon before resuming blood thinners such as Aspirin, Plavix, Coumadin, Warfarin, Eliquis, or Xarelto. All other home medications may be resumed as scheduled.    Norco contains acetaminophen which is the active ingredient in Tylenol. Do not exceed the recommended dose of 4000mg of Acetaminophen within 24 hours from all sources (norco and tylenol).    DIET  Start with a light and bland diet and slowly advance to regular food as your appetite improves. There are no specific food restrictions. Do not eat excessively. Eat small frequent meals.      Drink plenty of water. Try to eat a healthy high fiber diet.    Do not drink alcohol (beer, wine, liquor) or use tobacco products.    WOUND CARE  You can shower 24 hours after surgery and get the dressings wet.    The skin glue will stay on for 10 to 14 days after surgery.     Soap and water can get on the incisions but do not scrub the wounds. No hair dye or chemicals of any kind should get on the incisions.     Do not apply any topical ointments such as Neosporin or Hydrogen Peroxide.    Do not swim or submerge the incisions under water for 1 month.    ACTIVITY  Every day you should be up walking around the house. Do not lie in bed all day. Staying active prevents blood clots and pneumonia.    You can go up and down stairs. Do not lift more than 20 pounds or perform strenuous activity that requires straining the core muscles.    You may ride in a car but should not drive the car for at least one week.     APPOINTMENT  Please call our office at [phone] soon to make an appointment.    For questions or concerns please call our office between 8:30 a.m. and 5 p.m. Monday through Friday. The number above directs to the answering service after hours to reach the on-call physician.    Please call our office immediately for fever greater than 100.5, excess bleeding, inability to urinate, severe abdominal pain, severe diarrhea, uncontrollable vomiting.      For life threatening emergencies such as severe chest pain, difficulty breathing, or loss of conciousness call 911.

## 2025-05-02 ENCOUNTER — HOSPITAL ENCOUNTER (OUTPATIENT)
Facility: HOSPITAL | Age: 40
Setting detail: HOSPITAL OUTPATIENT SURGERY
Discharge: HOME | End: 2025-05-02
Attending: STUDENT IN AN ORGANIZED HEALTH CARE EDUCATION/TRAINING PROGRAM | Admitting: STUDENT IN AN ORGANIZED HEALTH CARE EDUCATION/TRAINING PROGRAM
Payer: COMMERCIAL

## 2025-05-02 ENCOUNTER — ANESTHESIA EVENT (OUTPATIENT)
Dept: SURGERY | Facility: HOSPITAL | Age: 40
End: 2025-05-02
Payer: COMMERCIAL

## 2025-05-02 ENCOUNTER — ANESTHESIA (OUTPATIENT)
Dept: SURGERY | Facility: HOSPITAL | Age: 40
End: 2025-05-02
Payer: COMMERCIAL

## 2025-05-02 VITALS
HEIGHT: 62 IN | BODY MASS INDEX: 33.68 KG/M2 | TEMPERATURE: 98 F | WEIGHT: 183 LBS | OXYGEN SATURATION: 97 % | DIASTOLIC BLOOD PRESSURE: 62 MMHG | SYSTOLIC BLOOD PRESSURE: 110 MMHG | RESPIRATION RATE: 18 BRPM | HEART RATE: 69 BPM

## 2025-05-02 DIAGNOSIS — G89.18 POSTOPERATIVE PAIN: Primary | ICD-10-CM

## 2025-05-02 DIAGNOSIS — K80.20 CALCULUS OF GALLBLADDER WITHOUT CHOLECYSTITIS WITHOUT OBSTRUCTION: ICD-10-CM

## 2025-05-02 LAB — B-HCG UR QL: NEGATIVE

## 2025-05-02 PROCEDURE — S2900 ROBOTIC SURGICAL SYSTEM: HCPCS | Performed by: STUDENT IN AN ORGANIZED HEALTH CARE EDUCATION/TRAINING PROGRAM

## 2025-05-02 PROCEDURE — 47562 LAPAROSCOPIC CHOLECYSTECTOMY: CPT | Performed by: STUDENT IN AN ORGANIZED HEALTH CARE EDUCATION/TRAINING PROGRAM

## 2025-05-02 PROCEDURE — 47562 LAPAROSCOPIC CHOLECYSTECTOMY: CPT

## 2025-05-02 RX ORDER — ROCURONIUM BROMIDE 10 MG/ML
INJECTION, SOLUTION INTRAVENOUS AS NEEDED
Status: DISCONTINUED | OUTPATIENT
Start: 2025-05-02 | End: 2025-05-02 | Stop reason: SURG

## 2025-05-02 RX ORDER — PROCHLORPERAZINE EDISYLATE 5 MG/ML
5 INJECTION INTRAMUSCULAR; INTRAVENOUS EVERY 8 HOURS PRN
Status: DISCONTINUED | OUTPATIENT
Start: 2025-05-02 | End: 2025-05-02

## 2025-05-02 RX ORDER — METOCLOPRAMIDE HYDROCHLORIDE 5 MG/ML
INJECTION INTRAMUSCULAR; INTRAVENOUS AS NEEDED
Status: DISCONTINUED | OUTPATIENT
Start: 2025-05-02 | End: 2025-05-02 | Stop reason: SURG

## 2025-05-02 RX ORDER — HYDROMORPHONE HYDROCHLORIDE 1 MG/ML
INJECTION, SOLUTION INTRAMUSCULAR; INTRAVENOUS; SUBCUTANEOUS
Status: COMPLETED
Start: 2025-05-02 | End: 2025-05-02

## 2025-05-02 RX ORDER — HYDROMORPHONE HYDROCHLORIDE 1 MG/ML
0.2 INJECTION, SOLUTION INTRAMUSCULAR; INTRAVENOUS; SUBCUTANEOUS EVERY 5 MIN PRN
Status: DISCONTINUED | OUTPATIENT
Start: 2025-05-02 | End: 2025-05-02

## 2025-05-02 RX ORDER — HYDROMORPHONE HYDROCHLORIDE 1 MG/ML
0.4 INJECTION, SOLUTION INTRAMUSCULAR; INTRAVENOUS; SUBCUTANEOUS EVERY 5 MIN PRN
Status: DISCONTINUED | OUTPATIENT
Start: 2025-05-02 | End: 2025-05-02

## 2025-05-02 RX ORDER — SCOPOLAMINE 1 MG/3D
1 PATCH, EXTENDED RELEASE TRANSDERMAL ONCE
Status: DISCONTINUED | OUTPATIENT
Start: 2025-05-02 | End: 2025-05-02 | Stop reason: HOSPADM

## 2025-05-02 RX ORDER — MIDAZOLAM HYDROCHLORIDE 1 MG/ML
INJECTION INTRAMUSCULAR; INTRAVENOUS AS NEEDED
Status: DISCONTINUED | OUTPATIENT
Start: 2025-05-02 | End: 2025-05-02 | Stop reason: SURG

## 2025-05-02 RX ORDER — HYDROCODONE BITARTRATE AND ACETAMINOPHEN 5; 325 MG/1; MG/1
1 TABLET ORAL ONCE AS NEEDED
Status: DISCONTINUED | OUTPATIENT
Start: 2025-05-02 | End: 2025-05-02

## 2025-05-02 RX ORDER — BUPIVACAINE HYDROCHLORIDE AND EPINEPHRINE 5; 5 MG/ML; UG/ML
INJECTION, SOLUTION EPIDURAL; INTRACAUDAL; PERINEURAL AS NEEDED
Status: DISCONTINUED | OUTPATIENT
Start: 2025-05-02 | End: 2025-05-02 | Stop reason: HOSPADM

## 2025-05-02 RX ORDER — INDOCYANINE GREEN AND WATER 25 MG
KIT INJECTION
Status: DISCONTINUED
Start: 2025-05-02 | End: 2025-05-02

## 2025-05-02 RX ORDER — HEPARIN SODIUM 5000 [USP'U]/ML
INJECTION, SOLUTION INTRAVENOUS; SUBCUTANEOUS
Status: COMPLETED
Start: 2025-05-02 | End: 2025-05-02

## 2025-05-02 RX ORDER — SODIUM CHLORIDE, SODIUM LACTATE, POTASSIUM CHLORIDE, CALCIUM CHLORIDE 600; 310; 30; 20 MG/100ML; MG/100ML; MG/100ML; MG/100ML
INJECTION, SOLUTION INTRAVENOUS CONTINUOUS
Status: DISCONTINUED | OUTPATIENT
Start: 2025-05-02 | End: 2025-05-02

## 2025-05-02 RX ORDER — HYDROCODONE BITARTRATE AND ACETAMINOPHEN 5; 325 MG/1; MG/1
2 TABLET ORAL ONCE AS NEEDED
Status: DISCONTINUED | OUTPATIENT
Start: 2025-05-02 | End: 2025-05-02

## 2025-05-02 RX ORDER — DEXAMETHASONE SODIUM PHOSPHATE 4 MG/ML
VIAL (ML) INJECTION AS NEEDED
Status: DISCONTINUED | OUTPATIENT
Start: 2025-05-02 | End: 2025-05-02 | Stop reason: SURG

## 2025-05-02 RX ORDER — INDOCYANINE GREEN AND WATER 25 MG
5 KIT INJECTION ONCE
Status: COMPLETED | OUTPATIENT
Start: 2025-05-02 | End: 2025-05-02

## 2025-05-02 RX ORDER — ACETAMINOPHEN 500 MG
1000 TABLET ORAL ONCE
Status: DISCONTINUED | OUTPATIENT
Start: 2025-05-02 | End: 2025-05-02 | Stop reason: HOSPADM

## 2025-05-02 RX ORDER — MEPERIDINE HYDROCHLORIDE 25 MG/ML
25 INJECTION INTRAMUSCULAR; INTRAVENOUS; SUBCUTANEOUS
Status: DISCONTINUED | OUTPATIENT
Start: 2025-05-02 | End: 2025-05-02

## 2025-05-02 RX ORDER — KETOROLAC TROMETHAMINE 30 MG/ML
INJECTION, SOLUTION INTRAMUSCULAR; INTRAVENOUS AS NEEDED
Status: DISCONTINUED | OUTPATIENT
Start: 2025-05-02 | End: 2025-05-02 | Stop reason: SURG

## 2025-05-02 RX ORDER — ONDANSETRON 2 MG/ML
4 INJECTION INTRAMUSCULAR; INTRAVENOUS EVERY 6 HOURS PRN
Status: DISCONTINUED | OUTPATIENT
Start: 2025-05-02 | End: 2025-05-02

## 2025-05-02 RX ORDER — OXYCODONE HYDROCHLORIDE 5 MG/1
5 TABLET ORAL EVERY 6 HOURS PRN
Qty: 15 TABLET | Refills: 0 | Status: SHIPPED | OUTPATIENT
Start: 2025-05-02

## 2025-05-02 RX ORDER — MIDAZOLAM HYDROCHLORIDE 1 MG/ML
1 INJECTION INTRAMUSCULAR; INTRAVENOUS EVERY 5 MIN PRN
Status: DISCONTINUED | OUTPATIENT
Start: 2025-05-02 | End: 2025-05-02

## 2025-05-02 RX ORDER — NALOXONE HYDROCHLORIDE 0.4 MG/ML
0.08 INJECTION, SOLUTION INTRAMUSCULAR; INTRAVENOUS; SUBCUTANEOUS AS NEEDED
Status: DISCONTINUED | OUTPATIENT
Start: 2025-05-02 | End: 2025-05-02

## 2025-05-02 RX ORDER — HYDROMORPHONE HYDROCHLORIDE 1 MG/ML
0.6 INJECTION, SOLUTION INTRAMUSCULAR; INTRAVENOUS; SUBCUTANEOUS EVERY 5 MIN PRN
Status: DISCONTINUED | OUTPATIENT
Start: 2025-05-02 | End: 2025-05-02

## 2025-05-02 RX ORDER — HEPARIN SODIUM 5000 [USP'U]/ML
5000 INJECTION, SOLUTION INTRAVENOUS; SUBCUTANEOUS ONCE
Status: COMPLETED | OUTPATIENT
Start: 2025-05-02 | End: 2025-05-02

## 2025-05-02 RX ORDER — ACETAMINOPHEN 500 MG
1000 TABLET ORAL ONCE AS NEEDED
Status: DISCONTINUED | OUTPATIENT
Start: 2025-05-02 | End: 2025-05-02

## 2025-05-02 RX ADMIN — KETOROLAC TROMETHAMINE 30 MG: 30 INJECTION, SOLUTION INTRAMUSCULAR; INTRAVENOUS at 14:45:00

## 2025-05-02 RX ADMIN — MIDAZOLAM HYDROCHLORIDE 2 MG: 1 INJECTION INTRAMUSCULAR; INTRAVENOUS at 13:30:00

## 2025-05-02 RX ADMIN — SODIUM CHLORIDE, SODIUM LACTATE, POTASSIUM CHLORIDE, CALCIUM CHLORIDE: 600; 310; 30; 20 INJECTION, SOLUTION INTRAVENOUS at 14:45:00

## 2025-05-02 RX ADMIN — DEXAMETHASONE SODIUM PHOSPHATE 4 MG: 4 MG/ML VIAL (ML) INJECTION at 14:45:00

## 2025-05-02 RX ADMIN — METOCLOPRAMIDE HYDROCHLORIDE 10 MG: 5 INJECTION INTRAMUSCULAR; INTRAVENOUS at 14:10:00

## 2025-05-02 RX ADMIN — ROCURONIUM BROMIDE 50 MG: 10 INJECTION, SOLUTION INTRAVENOUS at 13:34:00

## 2025-05-02 NOTE — ANESTHESIA PROCEDURE NOTES
Airway  Date/Time: 5/2/2025 1:39 PM  Reason: elective    Airway not difficult    General Information and Staff   Patient location during procedure: OR  Anesthesiologist: Neville Roberts MD  Performed: anesthesiologist   Performed by: Neville Roberts MD  Authorized by: Neville Roberts MD        Indications and Patient Condition  Indications for airway management: anesthesia  Sedation level: deep      Preoxygenated: yesPatient position: sniffing    Mask difficulty assessment: 1 - vent by mask    Final Airway Details    Final airway type: endotracheal airway    Successful airway: ETT  Cuffed: yes   Successful intubation technique: Video laryngoscopy  Facilitating devices/methods: intubating stylet  Endotracheal tube insertion site: oral  Blade: Pablo  Blade size: #3  ETT size (mm): 7.5    Cormack-Lehane Classification: grade IIA - partial view of glottis  Placement verified by: capnometry   Cuff volume (mL): 8  Measured from: teeth  ETT to teeth (cm): 23  Number of attempts at approach: 1  Number of other approaches attempted: 1    Other Attempts  Unsuccessful attempted airways: bag katja mask  Unsuccessful attempted endotracheal techniques: video laryngoscopy

## 2025-05-02 NOTE — ANESTHESIA PREPROCEDURE EVALUATION
PRE-OP EVALUATION    Patient Name: Evelyn Iyer    Admit Diagnosis: Calculus of gallbladder without cholecystitis without obstruction [K80.20]    Pre-op Diagnosis: Calculus of gallbladder without cholecystitis without obstruction [K80.20]    ROBOTIC LAPAROSCOPIC CHOLECYSTECTOMY, POSSIBLE OPEN, WITH INJECTION OF INDOCYANINE GREEN (ICG)    Anesthesia Procedure: ROBOTIC LAPAROSCOPIC CHOLECYSTECTOMY, POSSIBLE OPEN, WITH INJECTION OF INDOCYANINE GREEN (ICG) (Abdomen)    Surgeons and Role:     * Carmen Cm MD - Primary    Pre-op vitals reviewed.  Temp: 98.2 °F (36.8 °C)  Pulse: 65  Resp: 16  BP: 106/77  SpO2: 99 %  Body mass index is 33.47 kg/m².    Current medications reviewed.  Hospital Medications:  Current Medications[1]    Outpatient Medications:   Prescriptions Prior to Admission[2]    Allergies: Bactrim [sulfamethoxazole w/trimethoprim], Benzonatate, and Nirmatrelvir-ritonavir      Anesthesia Evaluation    Patient summary reviewed.    Anesthetic Complications  (-) history of anesthetic complications         GI/Hepatic/Renal    Negative GI/hepatic/renal ROS.                             Cardiovascular    Negative cardiovascular ROS.    Exercise tolerance: good                                                Endo/Other           (+) hypothyroidism                       Pulmonary    Negative pulmonary ROS.                       Neuro/Psych      (+) depression                                Past Surgical History[3]  Social Hx on file[4]  History   Drug Use No     Available pre-op labs reviewed.  Lab Results   Component Value Date    WBC 5.4 04/14/2025    RBC 5.02 04/14/2025    HGB 15.7 04/14/2025    HCT 46.6 04/14/2025    MCV 92.8 04/14/2025    MCH 31.3 04/14/2025    MCHC 33.7 04/14/2025    RDW 12.1 04/14/2025    .0 04/14/2025     Lab Results   Component Value Date     04/14/2025    K 4.0 04/14/2025     04/14/2025    CO2 25.0 04/14/2025    BUN 11 04/14/2025    CREATSERUM 1.05 (H)  04/14/2025    GLU 97 04/14/2025    CA 10.2 04/14/2025            Airway      Mallampati: I      Neck ROM: full Cardiovascular    Cardiovascular exam normal.         Dental    Dentition appears grossly intact         Pulmonary    Pulmonary exam normal.                 Other findings              ASA: 1   Plan: general  NPO status verified and patient meets guidelines.    Post-procedure pain management plan discussed with surgeon and patient.      Plan/risks discussed with: patient and spouse                Present on Admission:  **None**             [1]    [Transfer Hold] acetaminophen (Tylenol Extra Strength) tab 1,000 mg  1,000 mg Oral Once    [Transfer Hold] scopolamine (Transderm-Scop) 1 MG/3DAYS patch 1 patch  1 patch Transdermal Once    lactated ringers infusion   Intravenous Continuous    [COMPLETED] heparin (Porcine) 5000 UNIT/ML injection 5,000 Units  5,000 Units Subcutaneous Once    [COMPLETED] indocyanine green (IC-Green) injection 5 mg  5 mg Intravenous Once    ceFAZolin (Ancef) 2g in 10mL IV syringe premix  2 g Intravenous Once    indocyanine green (IC-Green) 25 MG injection        ceFAZolin (Ancef) 2 g/10mL IV syringe premix       [2]   Medications Prior to Admission   Medication Sig Dispense Refill Last Dose/Taking    levothyroxine 75 MCG Oral Tab TAKE 1 TABLET(75 MCG) BY MOUTH BEFORE BREAKFAST 90 tablet 1 5/2/2025 at  9:30 AM    FLUoxetine 20 MG Oral Cap Take 2 capsules (40 mg total) by mouth daily. 180 capsule 3 5/2/2025 at  9:30 AM    ferrous sulfate 325 (65 FE) MG Oral Tab EC Take 1 tablet (325 mg total) by mouth daily with breakfast.   Past Month    buPROPion  MG Oral Tablet 24 Hr Take 1 tablet (300 mg total) by mouth daily. 90 tablet 0 5/1/2025 Bedtime    PROPRANOLOL HCL ER 60 MG Oral Capsule SR 24 Hr TAKE 1 CAPSULE(60 MG) BY MOUTH DAILY (Patient taking differently: Take 1 capsule (60 mg total) by mouth in the morning.) 30 capsule 5 5/1/2025 Morning    Magnesium 100 MG Oral Tab Take 1  tablet (100 mg total) by mouth at bedtime.   Past Month    Cholecalciferol (VITAMIN D3) 25 MCG (1000 UT) Oral Cap Take 1 tablet by mouth in the morning.   Past Month    Rizatriptan Benzoate 10 MG Oral Tab use at onset; may repeat once after 2 hours- ONLY 2 IN 24 HOUR PERIOD MAX.  This is a 30 day supply. 12 tablet 0 More than a month   [3]   Past Surgical History:  Procedure Laterality Date    Breast reconstruction      RT reduction, LT implant    Colonoscopy  2007?    Eating and keeping food down was problematic, lots of nausea    Fracture surgery      Orif radial shaft fracture Left 01/17/2024    Other surgical history  20yo    breast reconstruction d/t congenital anomalyt    Reduction right     [4]   Social History  Socioeconomic History    Marital status:    Tobacco Use    Smoking status: Never     Passive exposure: Never    Smokeless tobacco: Never   Vaping Use    Vaping status: Never Used   Substance and Sexual Activity    Alcohol use: Yes     Alcohol/week: 2.0 standard drinks of alcohol     Types: 2 Glasses of wine per week     Comment: Sometimes dont drink at all - it depends on the week    Drug use: No   Other Topics Concern    Caffeine Concern Yes     Comment: Get headaches when don't have any    Exercise Yes     Comment: Weekly [place] class and daily at least 20min walks with dog    Seat Belt Yes    Special Diet Yes     Comment: Started bland diet on 4-13-25 due to stomach pains    Stress Concern Yes    Weight Concern Yes     Comment: Gained significant amount of weight in the last 2 years

## 2025-05-02 NOTE — INTERVAL H&P NOTE
Pre-op Diagnosis: Calculus of gallbladder without cholecystitis without obstruction [K80.20]    The above referenced H&P was reviewed by Carmen Cm MD on 5/2/2025, the patient was examined and no significant changes have occurred in the patient's condition since the H&P was performed.  I discussed with the patient and/or legal representative the potential benefits, risks and side effects of this procedure; the likelihood of the patient achieving goals; and potential problems that might occur during recuperation.  I discussed reasonable alternatives to the procedure, including risks, benefits and side effects related to the alternatives and risks related to not receiving this procedure.  We will proceed with procedure as planned.

## 2025-05-02 NOTE — OPERATIVE REPORT
[facility]  Operative Note    Evelny Iyer Location: OR   [facility] [id number] MRN [id number]    1985 Age 39 year old   Admission Date 2025 Operation Date 2025   Attending Physician Carmen Cm MD Operating Physician Carmen Cm MD   PCP Melissa Dewitt MD        Patient Name: Evelyn Iyer    Preoperative Diagnosis: Calculus of gallbladder without cholecystitis without obstruction [K80.20]    Postoperative Diagnosis: Same as pre-op diagnosis.    Primary Surgeon: Carmen Cm MD     Assistant: CARLTON Almazan     Anesthesia: General    Anesthesiologist: Anesthesiologist.: Neville Roberts MD    Procedures: Robot-assisted Laparoscopic Cholecystectomy with ICG     Implants: None    Specimen: Gallbladder    Drains: None    Estimated Blood Loss: Blood Output: 5 mL (2025  2:30 PM)       Complications: none    Condition: Good    Indications for Surgery:   Evelyn Iyer is a 39 year old female who presents with progressive epigastric and right upper quadrant abdominal pain. Work-up revealed cholelithiasis . The patient presents today for laparoscopic cholecystectomy.    Surgical Findings:   Cholelthiasis     Description of Procedure:   The patient was transported to the operating room and placed on the operating table in supine position.General endotracheal anesthesia was administered. Preoperative antibiotics were given. The abdomen was prepped and draped in sterile fashion. A time-out was performed.    A stab incision was made in the left upper quadrant 2 cms below the costal margin at the mid clavicular line. The Veress needle was introduced into the abdominal cavity and pneumoperitoneum was achieved to a pressure of 15 mmHg.   An 8 mm periumbilical incision was made.   An 8 mm trocar was placed through this incision with a 5 mm laparoscope visualizing the abdominal wall layers during entry.  Upon initial inspection of the abdominal cavity there was no  injury from our entry. There was no unexpected abnormality of the visible abdominal organs.  Three additional 8 mm trochars were placed in the mid right abdomen, right flank and left upper quadrant . Patient was placed in reverse Trendelenburg position and right side up.  The robot was docked. Instruments were placed under direct visualization.  Attention was turned to the right upper quadrant. The gallbladder dome was grasped and elevated,. The infundibulum was retracted. Indocyanine green had been injected preoperatively and firefly was used to confirm the location of the cystic duct and common bile duct. Dissection was initiated at the triangle of Calot.  After clearing the peritoneum and connective tissue the cystic duct and cystic artery were identified superior to the line of Rouviere. The infundibulum was dissected away from the liver bed. The critical view of safety was obtained. . The gallbladder was elevated from the gallbladder fossa using electrocautery in a top down approach. This further confirmed the correct anatomy.   Next, a clip was placed on the specimen side of the cystic duct and two clips were placed on the patient's side of the cystic duct. The duct was divided. Next, one clip was placed on the cystic artery on the specimen side, two towards the patient side, and the cystic artery was divided.  Once the gallbladder was dissected from the gallbladder fossa it was placed in an endocatch specimen bag and set aside.   Hemostasis on the gallbladder fossa was achieved with electrocautery. The previously placed clips on the cystic duct and cystic artery were visualized and inspected; they were well approximated, well situated, and there was no evidence of active bleeding or bile leak. The specimen was then extracted from the umbilical trocar site.  Pneumoperitoneum was released and trocars removed. The fascia at the umbilicus was reapproximated using #1 PDS suture. All skin incisions were cleansed,  irrigated, and injected with local anesthetic. Skin incisions were reapproximated using subcuticular 4-0 monocryl suture.  Skin glue was used to seal all the incisions.  The patient was awakened from anesthesia and brought to the recovery room in good condition. The patient tolerated the procedure well without apparent complication. All sponge, needle, and instrument counts were correct at the end of the case.  Carmen Cm MD   5/2/2025  2:33 PM

## 2025-05-02 NOTE — ANESTHESIA POSTPROCEDURE EVALUATION
[facility]    [facility] Patient Status:  Hospital Outpatient Surgery   Age/Gender 39 year old female MRN [id number]   Location [facility] SURGERY Attending Carmen Cm MD   Hosp Day # 0 PCP Melissa Dewitt MD       Anesthesia Post-op Note    ROBOTIC LAPAROSCOPIC CHOLECYSTECTOMY, WITH INJECTION OF INDOCYANINE GREEN (ICG)    Procedure Summary       Date: 05/02/25 Room / Location:  MAIN OR 08 /  MAIN OR    Anesthesia Start: 1330 Anesthesia Stop:     Procedure: ROBOTIC LAPAROSCOPIC CHOLECYSTECTOMY, WITH INJECTION OF INDOCYANINE GREEN (ICG) (Abdomen) Diagnosis:       Calculus of gallbladder without cholecystitis without obstruction      (Calculus of gallbladder without cholecystitis without obstruction [K80.20])    Surgeons: Carmen Cm MD Anesthesiologist: Neville Roberts MD    Anesthesia Type: general ASA Status: 1            Anesthesia Type: general    Vitals Value Taken Time   /73 05/02/25 14:53   Temp 98.1 05/02/25 14:56   Pulse 65 05/02/25 14:56   Resp 15 05/02/25 14:56   SpO2 84 % 05/02/25 14:56   Vitals shown include unfiled device data.        Patient Location: PACU    Anesthesia Type: general    Airway Patency: extubated    Postop Pain Control: adequate    Mental Status: mildly sedated but able to meaningfully participate in the post-anesthesia evaluation    Nausea/Vomiting: none    Cardiopulmonary/Hydration status: stable euvolemic    Complications: no apparent anesthesia related complications    Postop vital signs: stable    Dental Exam: Unchanged from Preop    Patient to be discharged from PACU when criteria met.

## 2025-05-16 ENCOUNTER — OFFICE VISIT (OUTPATIENT)
Facility: LOCATION | Age: 40
End: 2025-05-16
Payer: COMMERCIAL

## 2025-05-16 VITALS
SYSTOLIC BLOOD PRESSURE: 113 MMHG | DIASTOLIC BLOOD PRESSURE: 72 MMHG | OXYGEN SATURATION: 99 % | HEART RATE: 66 BPM | TEMPERATURE: 98 F

## 2025-05-16 DIAGNOSIS — Z98.890 POST-OPERATIVE STATE: Primary | ICD-10-CM

## 2025-05-16 PROBLEM — K80.20 CALCULUS OF GALLBLADDER WITHOUT CHOLECYSTITIS WITHOUT OBSTRUCTION: Status: RESOLVED | Noted: 2025-05-02 | Resolved: 2025-05-16

## 2025-05-16 NOTE — PROGRESS NOTES
Post Operative Visit Note       Active Problems  1. Post-operative state         Chief Complaint   Chief Complaint   Patient presents with    Post-Op     PO - 5/2 W/ANALI, ROBOTIC LAPAROSCOPIC CHOLECYSTECTOMY, WITH INJECTION OF INDOCYANINE GREEN (ICG),  pt states no symptoms.          History of Present Illness   The patient presents today for postoperative visit following robotic laparoscopic cholecystectomy on 5/2/2025 by Dr. Cm.    The patient states that since her surgery that she is overall doing very well.  She denies complaints today.  She denies nausea or vomiting.  She denies diarrhea or constipation.  She denies fever or chills.  She states that her incisions are healing well.  There is no redness or drainage noted.    Allergies  Evelyn is allergic to bactrim [sulfamethoxazole w/trimethoprim], benzonatate, and nirmatrelvir-ritonavir.    Past Medical / Surgical / Social / Family History    The past medical and past surgical history have been reviewed by me today.     Past Medical History:    Abdominal pain    Anxiety    On and off depending on situation- not constant    Back pain    Belching    Bloating    Body piercing    Calculus of gallbladder without cholecystitis without obstruction    Chest pain    Constipation    Decorative tattoo    Depression    Diarrhea, unspecified    Disorder of thyroid    Fatigue    Flatulence/gas pain/belching    Food intolerance    Headache disorder    Heartburn    Heavy menses    Hemorrhoids    History of depression    Hypothyroidism    Dr Terry found it & currently on medication for it    Indigestion    Loss of appetite    Menses painful    Migraine    Migraines    MRSA infection    leg    Nausea    Night sweats    Obesity    Pain with bowel movements    Skin blushing/flushing    Sleep disturbance    Stress    Tremor    Visual impairment    GLASSES    Vitamin D deficiency    Vomiting    Wears glasses    Weight gain    Weight loss     Past Surgical History:   Procedure  Laterality Date    Breast reconstruction      RT reduction, LT implant    Cholecystectomy  24    Colonoscopy  ?    Eating and keeping food down was problematic, lots of nausea    Fracture surgery      Orif radial shaft fracture Left 2024    Other surgical history  22yo    breast reconstruction d/t congenital anomalyt    Reduction right         The family history and social history have been reviewed by me today.    Family History   Problem Relation Age of Onset    Diabetes Father         Type 2 Onset - not born with it    Lipids Father         High cholesterol i think    Hypertension Father     Heart Attack Father         3-4 since ;  of heart failure in 2023    Heart Disease Father     Hypertension Mother     Lipids Mother         High cholesterol i think    Heart Disorder Paternal Grandmother         stroke    Stroke Paternal Grandmother         Got it very late in life but recovered     Social History     Socioeconomic History    Marital status:    Tobacco Use    Smoking status: Never     Passive exposure: Never    Smokeless tobacco: Never   Vaping Use    Vaping status: Never Used   Substance and Sexual Activity    Alcohol use: Yes     Alcohol/week: 2.0 standard drinks of alcohol     Types: 2 Glasses of wine per week     Comment: Sometimes dont drink at all - it depends on the week    Drug use: No   Other Topics Concern    Caffeine Concern Yes     Comment: Get headaches when don't have any    Exercise Yes     Comment: Weekly [employer] class and daily at least 20min walks with dog    Seat Belt Yes    Special Diet Yes     Comment: Started bland diet on 25 due to stomach pains    Stress Concern Yes    Weight Concern Yes     Comment: Gained significant amount of weight in the last 2 years        Current Outpatient Medications:     levothyroxine 75 MCG Oral Tab, TAKE 1 TABLET(75 MCG) BY MOUTH BEFORE BREAKFAST, Disp: 90 tablet, Rfl: 1    FLUoxetine 20 MG Oral Cap, Take 2 capsules  (40 mg total) by mouth daily., Disp: 180 capsule, Rfl: 3    ferrous sulfate 325 (65 FE) MG Oral Tab EC, Take 1 tablet (325 mg total) by mouth daily with breakfast., Disp: , Rfl:     buPROPion  MG Oral Tablet 24 Hr, Take 1 tablet (300 mg total) by mouth daily., Disp: 90 tablet, Rfl: 0    Rizatriptan Benzoate 10 MG Oral Tab, use at onset; may repeat once after 2 hours- ONLY 2 IN 24 HOUR PERIOD MAX.  This is a 30 day supply., Disp: 12 tablet, Rfl: 0    PROPRANOLOL HCL ER 60 MG Oral Capsule SR 24 Hr, TAKE 1 CAPSULE(60 MG) BY MOUTH DAILY (Patient taking differently: Take 1 capsule (60 mg total) by mouth in the morning.), Disp: 30 capsule, Rfl: 5    Magnesium 100 MG Oral Tab, Take 1 tablet (100 mg total) by mouth at bedtime., Disp: , Rfl:     Cholecalciferol (VITAMIN D3) 25 MCG (1000 UT) Oral Cap, Take 1 tablet by mouth in the morning., Disp: , Rfl:       Review of Systems  A 10 point Review of Systems has been completed by me today and is negative except as above in the HPI.    Physical Findings   /72 (BP Location: Left arm, Patient Position: Sitting, Cuff Size: adult)   Pulse 66   Temp 98.3 °F (36.8 °C) (Temporal)   LMP 05/09/2025 (Approximate)   SpO2 99%   Gen/psych: alert and oriented, cooperative, no apparent distress  Cardiovascular: regular rate  Respiratory: respirations unlabored, no wheeze  Abdominal: soft, non-tender, non-distended, no guarding/rebound  Incisions: Incisions are healing well.  Dermabond is in place.  There is mild healing ecchymosis.  There is no erythema or drainage.         Assessment/Plan  1. Post-operative state        1. The patient is doing well following laparoscopic cholecystectomy.  2. The patient is to continue with diet as tolerated.  3. The patient is to continue with lifting restrictions of no more than 20 pounds for 6 weeks from the date of the surgery.  4. Surgical pathology was discussed with the patient.  5. All questions and concerns were answered.  6. The  patient is return to the clinic as needed.         No orders of the defined types were placed in this encounter.       Imaging & Referrals   None    Follow Up  Return if symptoms worsen or fail to improve.    Mellissa Reece PA-C  [facility] General Surgery  5/16/2025  3:03 PM

## 2025-05-23 ENCOUNTER — OFFICE VISIT (OUTPATIENT)
Dept: FAMILY MEDICINE CLINIC | Facility: CLINIC | Age: 40
End: 2025-05-23
Payer: COMMERCIAL

## 2025-05-23 ENCOUNTER — TELEPHONE (OUTPATIENT)
Dept: FAMILY MEDICINE CLINIC | Facility: CLINIC | Age: 40
End: 2025-05-23

## 2025-05-23 VITALS
OXYGEN SATURATION: 99 % | HEART RATE: 71 BPM | RESPIRATION RATE: 16 BRPM | WEIGHT: 178 LBS | BODY MASS INDEX: 32.76 KG/M2 | DIASTOLIC BLOOD PRESSURE: 60 MMHG | SYSTOLIC BLOOD PRESSURE: 90 MMHG | HEIGHT: 62 IN | TEMPERATURE: 98 F

## 2025-05-23 DIAGNOSIS — R05.1 ACUTE COUGH: Primary | ICD-10-CM

## 2025-05-23 DIAGNOSIS — Z87.01 HISTORY OF PNEUMONIA: ICD-10-CM

## 2025-05-23 PROCEDURE — 3008F BODY MASS INDEX DOCD: CPT | Performed by: NURSE PRACTITIONER

## 2025-05-23 PROCEDURE — 3074F SYST BP LT 130 MM HG: CPT | Performed by: NURSE PRACTITIONER

## 2025-05-23 PROCEDURE — 3078F DIAST BP <80 MM HG: CPT | Performed by: NURSE PRACTITIONER

## 2025-05-23 PROCEDURE — 99213 OFFICE O/P EST LOW 20 MIN: CPT | Performed by: NURSE PRACTITIONER

## 2025-05-23 RX ORDER — DEXTROMETHORPHAN POLISTIREX 30 MG/5ML
30 SUSPENSION ORAL 2 TIMES DAILY PRN
Qty: 89 ML | Refills: 0 | Status: SHIPPED | OUTPATIENT
Start: 2025-05-23 | End: 2025-05-23

## 2025-05-23 RX ORDER — PREDNISONE 10 MG/1
TABLET ORAL
Qty: 12 TABLET | Refills: 0 | Status: SHIPPED | OUTPATIENT
Start: 2025-05-23 | End: 2025-05-29

## 2025-05-23 RX ORDER — AZITHROMYCIN 250 MG/1
TABLET, FILM COATED ORAL
Qty: 6 TABLET | Refills: 0 | Status: SHIPPED | OUTPATIENT
Start: 2025-05-24 | End: 2025-05-29

## 2025-05-23 RX ORDER — PREDNISONE 10 MG/1
TABLET ORAL
Qty: 12 TABLET | Refills: 0 | Status: SHIPPED | OUTPATIENT
Start: 2025-05-23 | End: 2025-05-23

## 2025-05-23 RX ORDER — DEXTROMETHORPHAN POLISTIREX 30 MG/5ML
30 SUSPENSION ORAL 2 TIMES DAILY PRN
Qty: 89 ML | Refills: 0 | Status: SHIPPED | OUTPATIENT
Start: 2025-05-23

## 2025-05-23 NOTE — TELEPHONE ENCOUNTER
Left message for the pt with the details of the medication from Bina  Advised to call back if any further questions

## 2025-05-23 NOTE — TELEPHONE ENCOUNTER
Yes, our system highlighted a drug interaction indicating it is not recommended due to allergy to benzonatate.  Sorry I didn't communicate this earlier!
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
## 2025-05-23 NOTE — TELEPHONE ENCOUNTER
Patient calling, [facility] pharmacy received script for Dextromethorphan Polistirex ER 30 MG/5ML Suspension Extended Release (Delsym), patient states she was expecting a cough syrup with codeine. Requesting Rx be changed. Endorsed to RN.

## 2025-05-24 ENCOUNTER — PATIENT MESSAGE (OUTPATIENT)
Dept: FAMILY MEDICINE CLINIC | Facility: CLINIC | Age: 40
End: 2025-05-24

## 2025-05-24 NOTE — TELEPHONE ENCOUNTER
Pt states cough is really bad. She looked at old medication and was given the following before with no problems/interactions.  Can you please prescribed:     Promethazine  with codeine      [id number] [place] - [address] [place] [address] [phone], [phone]   [address] [facility] [address]   Phone: [phone] Fax: [phone]     Please advise.  Thank you!

## 2025-05-26 ENCOUNTER — PATIENT MESSAGE (OUTPATIENT)
Dept: FAMILY MEDICINE CLINIC | Facility: CLINIC | Age: 40
End: 2025-05-26

## 2025-05-26 DIAGNOSIS — G43.009 MIGRAINE WITHOUT AURA AND WITHOUT STATUS MIGRAINOSUS, NOT INTRACTABLE: ICD-10-CM

## 2025-05-26 DIAGNOSIS — G43.709 CHRONIC MIGRAINE WITHOUT AURA WITHOUT STATUS MIGRAINOSUS, NOT INTRACTABLE: ICD-10-CM

## 2025-05-26 DIAGNOSIS — F43.23 ADJUSTMENT REACTION WITH ANXIETY AND DEPRESSION: ICD-10-CM

## 2025-05-27 RX ORDER — LEVOTHYROXINE SODIUM 75 UG/1
75 TABLET ORAL
Qty: 90 TABLET | Refills: 0 | Status: SHIPPED | OUTPATIENT
Start: 2025-05-27

## 2025-05-27 RX ORDER — PROPRANOLOL HYDROCHLORIDE 60 MG/1
1 CAPSULE, EXTENDED RELEASE ORAL DAILY
Qty: 90 CAPSULE | Refills: 0 | Status: SHIPPED | OUTPATIENT
Start: 2025-05-27

## 2025-05-27 NOTE — TELEPHONE ENCOUNTER
Other meds sent to provider to advise      Thyroid Medication Protocol [id number]/27/2025 09:26 AM   Protocol Details TSH in past 12 months    Last TSH value is normal    In person appointment or virtual visit in the past 12 mos or appointment in next 3 mos    Medication is active on med list          Hypertension Medications Protocol [id number]/27/2025 09:26 AM   Protocol Details CMP or BMP in past 12 months    Last BP reading less than 140/90    In person appointment or virtual visit in the past 12 mos or appointment in next 3 mos    EGFRCR or GFRNAA > 50    Medication is active on med list

## 2025-05-27 NOTE — TELEPHONE ENCOUNTER
Please call to triage    Not much else to do for cough other than supportive care and continue prednisone    If cough is worsening, may need to be see at urgent care for same day imaging    Rx has been sent for cough syrup, but can not be taken if driving within 8 hour time frame (patient previously requested replacement of delsym, rx sent this morning)

## 2025-05-27 NOTE — TELEPHONE ENCOUNTER
Advised patient of Bina FRIED's notes below, Rx sent to [facility]. Patient verbalized understanding and states she feels like cough is worsening - advised patient to go to [facility] for evaluation and same day imaging as recommended by Bina FRIED - patient verbalized understanding. Advised patient to call office back if needs follow-up - she verbalized understanding. No further questions at this time.

## 2025-05-27 NOTE — TELEPHONE ENCOUNTER
Last office visit: 4/14/25 acute, 1/31/25 annual physical    No future appointments.    Last filled: various    Last labs: 4/14/25    Last BP: 90/60 as of 5/23/25

## 2025-05-28 DIAGNOSIS — G43.709 CHRONIC MIGRAINE WITHOUT AURA WITHOUT STATUS MIGRAINOSUS, NOT INTRACTABLE: ICD-10-CM

## 2025-05-28 RX ORDER — RIZATRIPTAN BENZOATE 10 MG/1
TABLET ORAL
Qty: 12 TABLET | Refills: 0 | Status: SHIPPED | OUTPATIENT
Start: 2025-05-28

## 2025-05-28 RX ORDER — BUPROPION HYDROCHLORIDE 300 MG/1
300 TABLET ORAL DAILY
Qty: 90 TABLET | Refills: 1 | Status: SHIPPED | OUTPATIENT
Start: 2025-05-28

## 2025-05-28 RX ORDER — RIZATRIPTAN BENZOATE 10 MG/1
TABLET ORAL
Qty: 12 TABLET | Refills: 0 | Status: SHIPPED | OUTPATIENT
Start: 2025-05-28 | End: 2025-05-28

## 2025-05-28 NOTE — TELEPHONE ENCOUNTER
Received [facility] from [facility] regarding rizatriptan  Want to clarify at what onset is she to use this medication  Added \"use at onset of migraine\" to sig  Please advise

## 2025-05-29 DIAGNOSIS — R05.1 ACUTE COUGH: ICD-10-CM

## 2025-05-29 RX ORDER — PROMETHAZINE HYDROCHLORIDE AND CODEINE PHOSPHATE 6.25; 1 MG/5ML; MG/5ML
2.5 SYRUP ORAL EVERY 8 HOURS PRN
Qty: 118 ML | Refills: 0 | Status: SHIPPED | OUTPATIENT
Start: 2025-05-29

## 2025-05-29 NOTE — TELEPHONE ENCOUNTER
PT CALLED AND ADV THAT [facility] ADV PT THAT THEY CAN NOT FILL SCRIPT OF:     promethazine-codeine 6.25-10 MG/5ML Oral Syrup     ** PT WAS DIRECTED TO CALL PCP AND HAVE THEM SEND SCRIPT ELSE WHERE **    PLEASE SEND TO [facility]  & [address]    THANK YOU

## 2025-05-29 NOTE — TELEPHONE ENCOUNTER
Patient notified and verbalized understanding.     States states cough is not getting any better. States not any worse.  No wheezing or SOB, unless she is having a coughing fit.    Patient asking if she should have chest xray    Routed to Bina FRIED to advise

## 2025-05-31 ENCOUNTER — TELEPHONE (OUTPATIENT)
Dept: FAMILY MEDICINE CLINIC | Facility: CLINIC | Age: 40
End: 2025-05-31

## 2025-05-31 NOTE — TELEPHONE ENCOUNTER
Bina Flannery, APRN to Bina Flannery Nurse (Selected Message)    5/31/25  5:31 AM  Needs appointment for reassessment per plan of care/ofv notes from 05/23/2025  Walk in or with us if okay to wait for CXR, otherwise, IC for same day imaging

## 2025-05-31 NOTE — TELEPHONE ENCOUNTER
Patient reports persistent cough, not improving with treatment plan    Due for follow-up 05/28/2025    Recommending patient is seen with us or walk in care as I am out of office until monday

## 2025-06-02 ENCOUNTER — OFFICE VISIT (OUTPATIENT)
Dept: FAMILY MEDICINE CLINIC | Facility: CLINIC | Age: 40
End: 2025-06-02
Payer: COMMERCIAL

## 2025-06-02 ENCOUNTER — HOSPITAL ENCOUNTER (OUTPATIENT)
Dept: GENERAL RADIOLOGY | Age: 40
Discharge: HOME | End: 2025-06-02
Attending: NURSE PRACTITIONER
Payer: COMMERCIAL

## 2025-06-02 VITALS
BODY MASS INDEX: 32.76 KG/M2 | DIASTOLIC BLOOD PRESSURE: 64 MMHG | HEIGHT: 62 IN | WEIGHT: 178 LBS | TEMPERATURE: 98 F | HEART RATE: 80 BPM | SYSTOLIC BLOOD PRESSURE: 92 MMHG | OXYGEN SATURATION: 99 % | RESPIRATION RATE: 16 BRPM

## 2025-06-02 DIAGNOSIS — J32.9 SINOBRONCHITIS: ICD-10-CM

## 2025-06-02 DIAGNOSIS — R05.1 ACUTE COUGH: ICD-10-CM

## 2025-06-02 DIAGNOSIS — R05.1 ACUTE COUGH: Primary | ICD-10-CM

## 2025-06-02 DIAGNOSIS — J32.9 RHINOSINUSITIS: ICD-10-CM

## 2025-06-02 DIAGNOSIS — J40 SINOBRONCHITIS: ICD-10-CM

## 2025-06-02 PROCEDURE — 99214 OFFICE O/P EST MOD 30 MIN: CPT | Performed by: NURSE PRACTITIONER

## 2025-06-02 PROCEDURE — 71046 X-RAY EXAM CHEST 2 VIEWS: CPT | Performed by: NURSE PRACTITIONER

## 2025-06-02 PROCEDURE — 3074F SYST BP LT 130 MM HG: CPT | Performed by: NURSE PRACTITIONER

## 2025-06-02 PROCEDURE — 3078F DIAST BP <80 MM HG: CPT | Performed by: NURSE PRACTITIONER

## 2025-06-02 PROCEDURE — 3008F BODY MASS INDEX DOCD: CPT | Performed by: NURSE PRACTITIONER

## 2025-06-02 RX ORDER — ALBUTEROL SULFATE 90 UG/1
1-2 INHALANT RESPIRATORY (INHALATION) EVERY 4 HOURS PRN
Qty: 1 EACH | Refills: 0 | Status: SHIPPED | OUTPATIENT
Start: 2025-06-02

## 2025-06-02 RX ORDER — AZELASTINE 1 MG/ML
1 SPRAY, METERED NASAL 2 TIMES DAILY
Qty: 30 ML | Refills: 0 | Status: SHIPPED | OUTPATIENT
Start: 2025-06-02
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
## 2025-06-04 ENCOUNTER — OFFICE VISIT (OUTPATIENT)
Facility: LOCATION | Age: 40
End: 2025-06-04
Payer: COMMERCIAL

## 2025-06-04 ENCOUNTER — PATIENT MESSAGE (OUTPATIENT)
Dept: FAMILY MEDICINE CLINIC | Facility: CLINIC | Age: 40
End: 2025-06-04

## 2025-06-04 DIAGNOSIS — R05.1 ACUTE COUGH: Primary | ICD-10-CM

## 2025-06-04 DIAGNOSIS — R05.3 PERSISTENT COUGH FOR 3 WEEKS OR LONGER: Primary | ICD-10-CM

## 2025-06-04 PROCEDURE — 99203 OFFICE O/P NEW LOW 30 MIN: CPT | Performed by: STUDENT IN AN ORGANIZED HEALTH CARE EDUCATION/TRAINING PROGRAM

## 2025-06-04 NOTE — PROGRESS NOTES
Evelyn Iyer is a 39 year old female referred by CORKY Melgar for evaluation of dry cough.   Chief Complaint   Patient presents with    Cough     Persistent cough for 3wks/ dry and painful      HPI:   39 year old female presenting for evaluation of 3 weeks of dry cough.  Patient took a Z-Jaguar and steroids with minimal relief.  She reports that her  was sick around the time that her symptoms developed.  She also initially had URI symptoms that have since resolved but has a lingering cough.  She denies sinonasal symptoms including nasal obstruction, nasal discharge, and facial pressure.  She has minimal postnasal drip.  Patient denies a history of allergies.  No history of sinonasal surgeries.  She reports having a similar lingering cough after an episode of influenza in the past.  Patient was recently prescribed inhalers and Astelin nasal spray which she has been compliant with.      Current Medications[1]   Past Medical History[2]   Social History:  Short Social Hx on File[3]   Past Surgical History[4]      REVIEW OF SYSTEMS:   GENERAL HEALTH: feels well otherwise  GENERAL : denies fever, chills, sweats, weight loss, weight gain  SKIN: denies any unusual skin lesions or rashes  RESPIRATORY: denies shortness of breath with exertion  NEURO: denies headaches    EXAM:   LMP 05/09/2025 (Approximate)     System Findings Details   Constitutional  Overall appearance - Normal.   Head/Face  Facial features -- Normal. Skull - Normal.   Eyes  Sclera white, pupils equal and round, EOMI   Ears  External ears normal in appearance, EACs patent, TMs intact bilaterally, no evidence of middle ear effusion   Nose  External nose normal in appearance, nares patent, septum straight, no epistaxis   Throat  Posterior pharynx clear, uvula midline, tonsils 1+   Oral cavity  Lips normal in appearance, mucous membranes clear, no masses, FOM soft, tongue without lesions   Neck  Trachea midline, no lymphadenopathy, no  masses   Neurological  Memory - Normal. Cranial nerves - Cranial nerves II through XII grossly intact.     CXR 6/2/2025  Impression   CONCLUSION:  No acute pulmonary findings.          ASSESSMENT AND PLAN:   39 year old female presenting for evaluation of 3 weeks of dry cough.      - Patient without sinonasal symptoms or pathology on exam to indicate etiology of cough  - Given relatively short timeframe of cough coinciding with URI symptoms and recent prescription of inhalers, recommended to continue with prescribed regimen and follow-up with PCP    The patient indicates understanding of these issues and agrees to the plan.    Sonya Lane MD, KATHERYN  Facial Plastic & Reconstructive Surgery, Otolaryngology-Head & Neck Surgery  [facility]    This note was prepared using Dragon Medical voice recognition dictation software. As a result, errors may occur. When identified, these errors have been corrected. While every attempt is made to correct errors during dictation, discrepancies may still exist.          [1]   Current Outpatient Medications   Medication Sig Dispense Refill    Spacer/Aero-Holding Chambers Does not apply Device To be used to administer albuterol as directed. 1 each 0    albuterol 108 (90 Base) MCG/ACT Inhalation Aero Soln Inhale 1-2 puffs into the lungs every 4 (four) hours as needed for Wheezing or Shortness of Breath. 1 each 0    azelastine 0.1 % Nasal Solution 1 spray by Nasal route 2 (two) times daily. 30 mL 0    promethazine-codeine 6.25-10 MG/5ML Oral Syrup Take 2.5 mL by mouth every 8 (eight) hours as needed for cough. 118 mL 0    FLUoxetine 20 MG Oral Cap Take 2 capsules (40 mg total) by mouth daily. 180 capsule 1    buPROPion  MG Oral Tablet 24 Hr Take 1 tablet (300 mg total) by mouth daily. 90 tablet 1    Rizatriptan Benzoate 10 MG Oral Tab use at onset of migraine; may repeat once after 2 hours- ONLY 2 IN 24 HOUR PERIOD MAX.  This is a 30 day supply. 12 tablet 0    Propranolol  HCl ER 60 MG Oral Capsule SR 24 Hr Take 1 capsule (60 mg total) by mouth daily. 90 capsule 0    levothyroxine 75 MCG Oral Tab Take 1 tablet (75 mcg total) by mouth before breakfast. 90 tablet 0    dextromethorphan polistirex ER (DELSYM) 30 MG/5ML Oral Suspension Extended Release Take 5 mL (30 mg total) by mouth 2 (two) times daily as needed for cough. 89 mL 0    ferrous sulfate 325 (65 FE) MG Oral Tab EC Take 1 tablet (325 mg total) by mouth daily with breakfast.      Magnesium 100 MG Oral Tab Take 1 tablet (100 mg total) by mouth at bedtime.      Cholecalciferol (VITAMIN D3) 25 MCG (1000 UT) Oral Cap Take 1 tablet by mouth in the morning.     [2]   Past Medical History:   Abdominal pain    Anxiety    On and off depending on situation- not constant    Back pain    Belching    Bloating    Body piercing    Calculus of gallbladder without cholecystitis without obstruction    Chest pain    Constipation    Decorative tattoo    Depression    Diarrhea, unspecified    Disorder of thyroid    Fatigue    Flatulence/gas pain/belching    Food intolerance    Headache disorder    Heartburn    Heavy menses    Hemorrhoids    History of depression    Hypothyroidism    Dr Terry found it & currently on medication for it    Indigestion    Loss of appetite    Menses painful    Migraine    Migraines    MRSA infection    leg    Nausea    Night sweats    Obesity    Pain with bowel movements    Skin blushing/flushing    Sleep disturbance    Stress    Tremor    Visual impairment    GLASSES    Vitamin D deficiency    Vomiting    Wears glasses    Weight gain    Weight loss   [3]   Social History  Socioeconomic History    Marital status:    Tobacco Use    Smoking status: Never     Passive exposure: Never    Smokeless tobacco: Never   Vaping Use    Vaping status: Never Used   Substance and Sexual Activity    Alcohol use: Yes     Alcohol/week: 2.0 standard drinks of alcohol     Types: 2 Glasses of wine per week     Comment: Sometimes dont  drink at all - it depends on the week    Drug use: No   Other Topics Concern    Caffeine Concern Yes     Comment: Get headaches when don't have any    Exercise Yes     Comment: Weekly [employer] [place] class and daily at least 20min walks with dog    Seat Belt Yes    Special Diet Yes     Comment: Started bland diet on 4-13-25 due to stomach pains    Stress Concern Yes    Weight Concern Yes     Comment: Gained significant amount of weight in the last 2 years     Social Drivers of Health      Received from [facility]    Housing Stability   [4]   Past Surgical History:  Procedure Laterality Date    Breast reconstruction      RT reduction, LT implant    Cholecystectomy  5/2/25    Colonoscopy  2007?    Eating and keeping food down was problematic, lots of nausea    Fracture surgery      Orif radial shaft fracture Left 01/17/2024    Other surgical history  20yo    breast reconstruction d/t congenital anomalyt    Reduction right

## 2025-06-06 NOTE — TELEPHONE ENCOUNTER
[employer] message sent    Persistent cough began 05/18/2025.  Treated with prednisone, azithromycin, albuterol, azelastine nasal spray.  Negative CXR.  Patient would like to discuss with pulmonology team

## 2025-06-08 DIAGNOSIS — R05.1 ACUTE COUGH: ICD-10-CM

## 2025-06-09 RX ORDER — DEXTROMETHORPHAN POLISTIREX 30 MG/5ML
SUSPENSION ORAL
Qty: 89 ML | Refills: 0 | COMMUNITY
Start: 2025-06-09

## 2025-06-09 NOTE — TELEPHONE ENCOUNTER
Pt failed refill protocol for the following reasons:  Requested Renewals     Name from pharmacy: DEXTROMETHORPHAN ER 30MG/5ML LIQ         Will file in chart as: DEXTROMETHORPHAN POLISTIREX ER 30 MG/5ML Oral Suspension Extended Release    Sig: TAKE 5ML BY MOUTH TWICE DAILY AS NEEDED FOR COUGH    Disp: 89 mL    Refills: 0 (Pharmacy requested: Not specified)    Start: 6/8/2025    Class: Normal    Non-formulary For: Acute cough    Last ordered: 2 weeks ago (5/23/2025) by CORKY Melgar    Last refill: 6/8/2025    Rx #: [id number]       To be filled at: [facility] #[id number] [place] [address] RD AT [facility] [address], [phone], [phone]            Last refill: 5/23/25  Last appt: 6/2/25  Next appt: No future appointments.      Forward to Nurse Practitioner Bina Flannery, please advise on refills. Thank you.

## 2025-06-10 DIAGNOSIS — R05.1 ACUTE COUGH: ICD-10-CM

## 2025-06-10 RX ORDER — PROMETHAZINE HYDROCHLORIDE AND CODEINE PHOSPHATE 6.25; 1 MG/5ML; MG/5ML
2.5 SYRUP ORAL EVERY 8 HOURS PRN
Qty: 118 ML | Refills: 0 | Status: SHIPPED | OUTPATIENT
Start: 2025-06-10

## 2025-06-25 ENCOUNTER — OFFICE VISIT (OUTPATIENT)
Facility: CLINIC | Age: 40
End: 2025-06-25
Payer: COMMERCIAL

## 2025-06-25 VITALS
HEART RATE: 71 BPM | DIASTOLIC BLOOD PRESSURE: 66 MMHG | RESPIRATION RATE: 16 BRPM | BODY MASS INDEX: 32.2 KG/M2 | HEIGHT: 62 IN | SYSTOLIC BLOOD PRESSURE: 102 MMHG | WEIGHT: 175 LBS | OXYGEN SATURATION: 99 %

## 2025-06-25 DIAGNOSIS — R05.3 CHRONIC COUGH: Primary | ICD-10-CM

## 2025-06-25 PROCEDURE — 99204 OFFICE O/P NEW MOD 45 MIN: CPT | Performed by: INTERNAL MEDICINE

## 2025-06-25 PROCEDURE — 3078F DIAST BP <80 MM HG: CPT | Performed by: INTERNAL MEDICINE

## 2025-06-25 PROCEDURE — 3074F SYST BP LT 130 MM HG: CPT | Performed by: INTERNAL MEDICINE

## 2025-06-25 PROCEDURE — 3008F BODY MASS INDEX DOCD: CPT | Performed by: INTERNAL MEDICINE

## 2025-06-25 RX ORDER — BUDESONIDE AND FORMOTEROL FUMARATE DIHYDRATE 80; 4.5 UG/1; UG/1
2 AEROSOL RESPIRATORY (INHALATION) 2 TIMES DAILY
Qty: 10.2 G | Refills: 5 | Status: SHIPPED | OUTPATIENT
Start: 2025-06-25

## 2025-06-25 NOTE — PROGRESS NOTES
[facility] General Pulmonary Consult Note    Chief Complaint:  Chief Complaint   Patient presents with    Follow - Up     Pt c/o cough x1 month        History of Present Illness:  History of Present Illness  Evelyn Iyer is a 39 year old female who presents with a persistent cough following a recent cold.    She has been experiencing a persistent cough that began after a recent cold approximately one month ago. The cough is mostly dry, with minimal sputum production occurring only after taking Mucinex. Initially, the cough was severe enough to interfere with conversation, but it has improved over the past week. Her voice remains slightly altered.    She recalls a similar episode last year following influenza B, which also resulted in a prolonged cough that eventually resolved. No chest tightness or regular shortness of breath is present, although she experiences shortness of breath during coughing fits.    She was prescribed an albuterol inhaler, which has not been effective. She has no history of asthma or chronic lung disease and does not smoke, though she has tried smoking in the past. Her past medical history includes walking pneumonia and bronchitis.    During the review of symptoms, she denies any current chest tightness or significant sputum production. She confirms that the cough is mostly dry and experiences shortness of breath only during coughing episodes.      Past Medical History:   Past Medical History[1]     Past Surgical History: Past Surgical History[2]    Family Medical History: Family History[3]     Social History:   Social History     Socioeconomic History    Marital status:      Spouse name: Not on file    Number of children: Not on file    Years of education: Not on file    Highest education level: Not on file   Occupational History    Not on file   Tobacco Use    Smoking status: Never     Passive exposure: Never    Smokeless tobacco: Never   Vaping Use    Vaping status: Never Used    Substance and Sexual Activity    Alcohol use: Yes     Alcohol/week: 2.0 standard drinks of alcohol     Types: 2 Glasses of wine per week     Comment: Sometimes dont drink at all - it depends on the week    Drug use: No    Sexual activity: Not on file   Other Topics Concern    Caffeine Concern Yes     Comment: Get headaches when don't have any    Exercise Yes     Comment: Weekly [place] class and daily at least 20min walks with dog    Seat Belt Yes    Special Diet Yes     Comment: Started bland diet on 4-13-25 due to stomach pains    Stress Concern Yes    Weight Concern Yes     Comment: Gained significant amount of weight in the last 2 years     Service Not Asked    Blood Transfusions Not Asked    Occupational Exposure Not Asked    Hobby Hazards Not Asked    Sleep Concern Not Asked    Back Care Not Asked    Bike Helmet Not Asked    Self-Exams Not Asked   Social History Narrative    Not on file     Social Drivers of Health     Food Insecurity: Not on file   Transportation Needs: Not on file   Stress: Not on file   Housing Stability: Low Risk  (1/10/2024)    Received from [facility]    Housing Stability     Mortgage Payment Concerns?: Not on file     Number of Places Lived in the Last Year: Not on file     Unstable Housing?: Not on file        Allergies: Bactrim [sulfamethoxazole w/trimethoprim], Benzonatate, and Nirmatrelvir-ritonavir     Medications: Current Medications[4]    Review of Systems: Review of Systems    Physical Exam:  /66 (BP Location: Right arm, Patient Position: Sitting, Cuff Size: adult)   Pulse 71   Resp 16   Ht 5' 2\" (1.575 m)   Wt 175 lb (79.4 kg)   LMP 05/09/2025 (Approximate)   SpO2 99%   BMI 32.01 kg/m²      Constitutional: alert, cooperative. No acute distress.  HEENT: Head NC/AT. Nares normal. Septum midline. Mucosa normal. No drainage or sinus tenderness.. Mallampati 2+  Cardio: Regular rate and rhythm. Normal S1 and S2. No murmurs.   Respiratory:  Thorax symmetrical with no labored breathing. clear to auscultation bilaterally  GI: NABS. Abd soft, non-tender.  Extremities: No clubbing or cyanosis. No BLE edema.    Neurologic: A&Ox3. No gross motor deficits.  Skin: Warm, dry  Psych: Calm, cooperative. Pleasant affect.    Results:  Images personally reviewed - my own review dictated as below  Results    WBC: 5.4, done on 4/14/2025.  HGB: 15.7, done on 4/14/2025.  PLT: 330, done on 4/14/2025.     Glucose: 97, done on 4/14/2025.  Cr: 1.05, done on 4/14/2025.  Last eGFR was 69 on 4/14/2025.  CA: 10.2, done on 4/14/2025.  Na: 136, done on 4/14/2025.  K: 4, done on 4/14/2025.  Cl: 103, done on 4/14/2025.  CO2: 25, done on 4/14/2025.  Last ALB was 4.8% done on 4/14/2025.     XR CHEST PA + LAT CHEST (CPT=71046)  Result Date: 6/2/2025  CONCLUSION:  No acute pulmonary findings.   LOCATION:  [facility]   Dictated by (CST): Alvin Moore MD on 6/02/2025 at 4:10 PM     Finalized by (CST): Alvin Moore MD on 6/02/2025 at 4:11 PM       US ABDOMEN COMPLETE (CPT=76700)  Result Date: 4/14/2025  CONCLUSION:   A 1.3 cm gallstone the gallbladder neck is noted without evidence of acute cholecystitis at the time of the ultrasound.  However, if there is high clinical suspicion a follow-up HIDA scan may be done.  Fatty infiltration of the liver.   LOCATION:  [id number]    Dictated by (CST): Gagandeep De Paz MD on 4/14/2025 at 4:42 PM     Finalized by (CST): Gagandeep De Paz MD on 4/14/2025 at 4:43 PM          Assessment/Plan:  Assessment & Plan  Post-viral reactive airways  Post-viral reactive airways following recent viral illness with persistent dry cough. Symptoms suggest post-viral etiology rather than asthma. Expected improvement in 4-6 weeks. Symbicort may expedite recovery.  - Prescribed Symbicort inhaler for 3-4 weeks.  - Advised inhaler use if symptoms worsen or with another cold.  - Consider asthma testing if symptoms recur without viral illness.  - Reassured condition  likely to improve spontaneously.        Return in about 6 weeks (around 2025).    Anneliese Guzman MD  2025         [1]   Past Medical History:   Abdominal pain    Anxiety    On and off depending on situation- not constant    Back pain    Belching    Bloating    Body piercing    Calculus of gallbladder without cholecystitis without obstruction    Chest pain    Constipation    Decorative tattoo    Depression    Diarrhea, unspecified    Disorder of thyroid    Fatigue    Flatulence/gas pain/belching    Food intolerance    Headache disorder    Heartburn    Heavy menses    Hemorrhoids    History of depression    Hypothyroidism    Dr Terry found it & currently on medication for it    Indigestion    Loss of appetite    Menses painful    Migraine    Migraines    MRSA infection    leg    Nausea    Night sweats    Obesity    Pain with bowel movements    Pneumonia due to organism    Walking pneumonia    Skin blushing/flushing    Sleep disturbance    Stress    Tremor    Visual impairment    GLASSES    Vitamin D deficiency    Vomiting    Wears glasses    Weight gain    Weight loss   [2]   Past Surgical History:  Procedure Laterality Date    Breast reconstruction      RT reduction, LT implant    Cholecystectomy  24    Colonoscopy  ?    Eating and keeping food down was problematic, lots of nausea    Fracture surgery      Orif radial shaft fracture Left 2024    Other surgical history  22yo    breast reconstruction d/t congenital anomalyt    Reduction right     [3]   Family History  Problem Relation Age of Onset    Diabetes Father         Type 2 Onset - not born with it    Lipids Father         High cholesterol i think    Hypertension Father     Heart Attack Father         3-4 since ;  of heart failure in 2023    Heart Disease Father     Hypertension Mother     Lipids Mother         High cholesterol i think    Heart Disorder Paternal Grandmother         stroke    Stroke Paternal  Grandmother         Got it very late in life but recovered   [4]   Current Outpatient Medications   Medication Sig Dispense Refill    Budesonide-Formoterol Fumarate (SYMBICORT) 80-4.5 MCG/ACT Inhalation Aerosol Inhale 2 puffs into the lungs 2 (two) times daily. 10.2 g 5    promethazine-codeine 6.25-10 MG/5ML Oral Syrup Take 2.5 mL by mouth every 8 (eight) hours as needed for cough. 118 mL 0    Spacer/Aero-Holding Chambers Does not apply Device To be used to administer albuterol as directed. 1 each 0    albuterol 108 (90 Base) MCG/ACT Inhalation Aero Soln Inhale 1-2 puffs into the lungs every 4 (four) hours as needed for Wheezing or Shortness of Breath. 1 each 0    azelastine 0.1 % Nasal Solution 1 spray by Nasal route 2 (two) times daily. 30 mL 0    FLUoxetine 20 MG Oral Cap Take 2 capsules (40 mg total) by mouth daily. 180 capsule 1    buPROPion  MG Oral Tablet 24 Hr Take 1 tablet (300 mg total) by mouth daily. 90 tablet 1    Rizatriptan Benzoate 10 MG Oral Tab use at onset of migraine; may repeat once after 2 hours- ONLY 2 IN 24 HOUR PERIOD MAX.  This is a 30 day supply. 12 tablet 0    Propranolol HCl ER 60 MG Oral Capsule SR 24 Hr Take 1 capsule (60 mg total) by mouth daily. 90 capsule 0    levothyroxine 75 MCG Oral Tab Take 1 tablet (75 mcg total) by mouth before breakfast. 90 tablet 0    ferrous sulfate 325 (65 FE) MG Oral Tab EC Take 1 tablet (325 mg total) by mouth daily with breakfast.      Magnesium 100 MG Oral Tab Take 1 tablet (100 mg total) by mouth at bedtime.      Cholecalciferol (VITAMIN D3) 25 MCG (1000 UT) Oral Cap Take 1 tablet by mouth in the morning.

## 2025-06-25 NOTE — PROGRESS NOTES
The following individual(s) verbally consented to be recorded using ambient AI listening technology and understand that they can each withdraw their consent to this listening technology at any point by asking the clinician to turn off or pause the recording:    Patient name: Evelyn Schmitz Jaylon  Additional names:

## 2025-06-29 DIAGNOSIS — G43.709 CHRONIC MIGRAINE WITHOUT AURA WITHOUT STATUS MIGRAINOSUS, NOT INTRACTABLE: ICD-10-CM

## 2025-06-30 RX ORDER — RIZATRIPTAN BENZOATE 10 MG/1
TABLET ORAL
Qty: 12 TABLET | Refills: 0 | Status: SHIPPED | OUTPATIENT
Start: 2025-06-30

## 2025-06-30 NOTE — TELEPHONE ENCOUNTER
Last office visit 6/2/25 with Bina  Last refilled on 5/28/25 for # 12 with 0 refills  Future Appointments   Date Time Provider Department Center   8/11/2025 10:15 AM Anneliese Guzman MD [id number]        Thank you.

## 2025-08-03 DIAGNOSIS — G43.709 CHRONIC MIGRAINE WITHOUT AURA WITHOUT STATUS MIGRAINOSUS, NOT INTRACTABLE: ICD-10-CM

## 2025-08-04 RX ORDER — RIZATRIPTAN BENZOATE 10 MG/1
TABLET ORAL
Qty: 12 TABLET | Refills: 0 | Status: SHIPPED | OUTPATIENT
Start: 2025-08-04

## 2025-08-28 DIAGNOSIS — G43.009 MIGRAINE WITHOUT AURA AND WITHOUT STATUS MIGRAINOSUS, NOT INTRACTABLE: ICD-10-CM

## 2025-08-28 RX ORDER — PROPRANOLOL HYDROCHLORIDE 60 MG/1
1 CAPSULE, EXTENDED RELEASE ORAL DAILY
Qty: 90 CAPSULE | Refills: 0 | Status: SHIPPED | OUTPATIENT
Start: 2025-08-28

## 2025-08-28 RX ORDER — LEVOTHYROXINE SODIUM 75 UG/1
75 TABLET ORAL
Qty: 90 TABLET | Refills: 0 | Status: SHIPPED | OUTPATIENT
Start: 2025-08-28

## (undated) DIAGNOSIS — E03.8 HYPOTHYROIDISM DUE TO HASHIMOTO'S THYROIDITIS: ICD-10-CM

## (undated) DIAGNOSIS — E06.3 HYPOTHYROIDISM DUE TO HASHIMOTO'S THYROIDITIS: ICD-10-CM

## (undated) DEVICE — ROBOTIC GENERAL: Brand: MEDLINE INDUSTRIES, INC.

## (undated) DEVICE — ENDOPATH ULTRA VERESS INSUFFLATION NEEDLES WITH LUER LOCK CONNECTORS: Brand: ENDOPATH

## (undated) DEVICE — BINDER ABD SM M W9IN FOR 30-45IN 3 PNL E

## (undated) DEVICE — LARGE HEM-O-LOK CLIP APPLIER: Brand: ENDOWRIST

## (undated) DEVICE — PERMANENT CAUTERY HOOK: Brand: ENDOWRIST

## (undated) DEVICE — COLUMN DRAPE

## (undated) DEVICE — 40580 - THE PINK PAD - ADVANCED TRENDELENBURG POSITIONING KIT: Brand: 40580 - THE PINK PAD - ADVANCED TRENDELENBURG POSITIONING KIT

## (undated) DEVICE — DISPOSABLE GRASPER: Brand: EPIX LAPAROSCOPIC GRASPER

## (undated) DEVICE — PROGRASP FORCEPS: Brand: ENDOWRIST

## (undated) DEVICE — BLADELESS OBTURATOR: Brand: WECK VISTA

## (undated) DEVICE — SUT PDS II 1 36IN ABSRB VLT L36MM CT-1

## (undated) DEVICE — SUT MCRYL 4-0 18IN PS-2 ABSRB UD 19MM 3/8 CIR

## (undated) DEVICE — ARM DRAPE

## (undated) DEVICE — DRAPE,TOP,102X53,STERILE: Brand: MEDLINE

## (undated) DEVICE — SEAL

## (undated) DEVICE — Device: Brand: SUTURE PASSOR PRO

## (undated) DEVICE — ANCHOR TISSUE RETRIEVAL SYSTEM, BAG SIZE 125 ML, PORT SIZE 8 MM: Brand: ANCHOR TISSUE RETRIEVAL SYSTEM

## (undated) DEVICE — LAPAROVUE VISIBILITY SYSTEM LAPAROSCOPIC SOLUTIONS: Brand: LAPAROVUE

## (undated) DEVICE — DAVINCI XI CLIP HEM O LOK LARGE PURPLE

## (undated) DEVICE — GLOVE SUR 7 SENSICARE PI PIP CRM PWD F

## (undated) DEVICE — FORCE BIPOLAR: Brand: ENDOWRIST

## (undated) DEVICE — GLOVE,SURG,SENSICARE SLT,LF,PF,7: Brand: MEDLINE

## (undated) DEVICE — GLOVE SUR 7.5 SENSICARE PI PIP GRN PWD F

## (undated) DEVICE — ADHESIVE SKIN TOP FOR WND CLSR DERMBND ADV

## (undated) DEVICE — SYRINGE MED 10ML LL TIP W/O SFTY DISP

## (undated) DEVICE — COVER,LIGHT,CAMERA,HARD,1/PK,STRL: Brand: MEDLINE

## (undated) NOTE — LETTER
Chucky Yañez  [address] [place] [address]  [place] [address]    4/5/2019      Dear  Chucky Yañez    In order to provide the highest quality care, JASE Welch uses a sophisticated computer system to track our patient's rec

## (undated) NOTE — LETTER
01/02/19        Alfredo Matson  [address] [place] [address]      Dear Gunner Dillard,    [address] [facility] records indicate that you have outstanding lab work and or testing that was ordered for you and has not yet been completed:  Lab Frequency Next Occurrence   TS

## (undated) NOTE — LETTER
Date: 4/3/2019    Patient Name: Gina Hodgkin  : 1985      To Whom it may concern:    Porsche Bullock is under my care, I am her primary care provider.     Porsche Bullock has a known, long-standing history of migraines, often triggered by light and sound and

## (undated) NOTE — LETTER
09/11/18        Elizabeth Tampa  [address]  [place] [address]      Dear Suzie Chavez,    [address] [facility] records indicate that you have outstanding lab work and or testing that was ordered for you and has not yet been completed:  Lab Frequency Next Occurrence   TS

## (undated) NOTE — LETTER
08/05/19        Luzma Barber  [address]  [place] [address]      Dear Antonina Baxter,    [address] [facility] records indicate that you have outstanding lab work and or testing that was ordered for you and has not yet been completed:  Lab Frequency Next Occurrence   TS

## (undated) NOTE — LETTER
2020      RE: Alfredo Matson  : 1985      To Whom it May Concern,      Alfredo Matson was seen via a telehealth visit on 2020. She is displaying COVID like symptoms.  Although her COVID testing was negative at an outside facilit

## (undated) NOTE — Clinical Note
05/19/2017        Anabella Miramontes  [address] [facility]  [place] [address]      Dear Jason Adamson,    [address] [facility] records indicate that you have outstanding lab work and or testing that was ordered for you and has not yet been completed:  THYROID LABS  To provide you with

## (undated) NOTE — LETTER
Date: 5/6/2024    Patient Name: Evelyn Iyer          To Whom it may concern:    This letter has been written at the patient's request. The above patient was seen at [facility] for treatment of a medical condition.    Please excuse Evelyn from work May 6th and 7th.  Evelyn may return to work May 8th, 2024 without restrictions as long as is fever free 24 hours prior to the start of her next scheduled shift and symptoms are improving.    Sincerely,        CORKY Melgar

## (undated) NOTE — LETTER
02/12/18        [address]  [place] [address]      Dear Karissa San,    [address] [facility] records indicate that you have outstanding lab work and or testing that was ordered for you and has not yet been completed:  Lab Frequency Next Occurrence   VITAMI

## (undated) NOTE — MR AVS SNAPSHOT
[address] [facility] [address]  [phone]               Thank you for choosing us for your health care visit with Corazon Etienne MD.  We are glad to serve you and happy to provide you with this sum Control Line Present with a clear background (yes/no) yes Yes/No    Kit Lot # [id number] Numeric    Kit Expiration Date 6/2018 Date                  Medications Administered in the Office Today     Penicillin G Benzathine (BICILLIN LA) [id number] UNIT/2ML i Start activities slowly and build up over time Do what you like   Get your heart pumping – brisk walking, biking, swimming Even 10 minute increments are effective and add up over the week   2 ½ hours per week – spread out over time Use a keron to keep you

## (undated) NOTE — Clinical Note
03/31/2017        Anabella Miramontes  [address] [facility] [address] [address] [facility] [address]  [address] [place] [address]      Dear Jason Adamson,    [address] [facility] records indicate that you have outstanding lab work and or testing that was ordered for you and has not yet been completed:  Thyroid Labs  To provide you with

## (undated) NOTE — LETTER
Date: 5/1/2022    Patient Name: Kassidy Talley          To Whom it may concern: This letter has been written at the patient's request. The above patient was seen at the [facility] for treatment of a medical condition. This patient should be excused from attending work 5/2/2022 due to illness.         Sincerely,        CORKY Mccurdy

## (undated) NOTE — LETTER
Aliyah Moore  [address] [facility]  [place] [facility] [address]    11/27/2018      Dear  Aliyah Moore    In order to provide the highest quality care, JASE Vogt uses a sophisticated computer system to track our patient's r

## (undated) NOTE — Clinical Note
Nehal, Assessment Non-resolving right gluteal soft tissue infection and folliculitis of left groin. I suspect MRSA although initial culture demonstrated staphylococcal species non-MRSA. We will start doxycycline for 10 days and proceed for possibly 1 mon

## (undated) NOTE — LETTER
Date: 5/7/2024    Patient Name: Evelyn Iyer          To Whom it may concern:    This letter is to inform you that Evelyn has tested positive for covid infection.      Today, May 7th, 2024, I am recommending that Evelyn stays home and away from others for at least 24 hours after her symptoms are getting better and at least 24 hours after her temperature returns and stays below 100.5F.       Sincerely,        CORKY Melgar

## (undated) NOTE — MR AVS SNAPSHOT
[address] [facility] [address]  [phone]               Thank you for choosing us for your health care visit with Shae Moy MD.  We are glad to serve you and happy to provide you with this sum Assoc Dx:  Routine history and physical examination of adult [Z00.00], Irregular periods [N92.6]           Estradiol [E]    Complete by:  Jun 16, 2017 (Approximate)    Assoc Dx:  Routine history and physical examination of adult [Z00.00], Irregular period 128/78 mmHg 88 98.1 °F (36.7 °C) (Temporal) 179 lb           Current Medications          This list is accurate as of: 6/16/17  2:41 PM.  Always use your most recent med list.                escitalopram 10 MG Tabs   Take 1 tablet (10 mg total) by mouth o